# Patient Record
Sex: MALE | Race: WHITE | Employment: OTHER | URBAN - METROPOLITAN AREA
[De-identification: names, ages, dates, MRNs, and addresses within clinical notes are randomized per-mention and may not be internally consistent; named-entity substitution may affect disease eponyms.]

---

## 2022-07-09 LAB — HBA1C MFR BLD HPLC: 7.5 %

## 2022-10-15 ENCOUNTER — HOSPITAL ENCOUNTER (INPATIENT)
Age: 67
LOS: 4 days | Discharge: HOME | DRG: 864 | End: 2022-10-19
Attending: EMERGENCY MEDICINE | Admitting: INTERNAL MEDICINE
Payer: MEDICARE

## 2022-10-15 ENCOUNTER — APPOINTMENT (OUTPATIENT)
Dept: GENERAL RADIOLOGY | Age: 67
DRG: 864 | End: 2022-10-15
Attending: EMERGENCY MEDICINE
Payer: MEDICARE

## 2022-10-15 ENCOUNTER — APPOINTMENT (OUTPATIENT)
Dept: CT IMAGING | Age: 67
DRG: 864 | End: 2022-10-15
Attending: INTERNAL MEDICINE
Payer: MEDICARE

## 2022-10-15 ENCOUNTER — APPOINTMENT (OUTPATIENT)
Dept: CT IMAGING | Age: 67
DRG: 864 | End: 2022-10-15
Attending: EMERGENCY MEDICINE
Payer: MEDICARE

## 2022-10-15 DIAGNOSIS — R21 RASH AND NONSPECIFIC SKIN ERUPTION: ICD-10-CM

## 2022-10-15 DIAGNOSIS — D72.829 LEUKOCYTOSIS, UNSPECIFIED TYPE: ICD-10-CM

## 2022-10-15 DIAGNOSIS — R65.20 SEVERE SEPSIS (HCC): Primary | ICD-10-CM

## 2022-10-15 DIAGNOSIS — I95.89 OTHER SPECIFIED HYPOTENSION: ICD-10-CM

## 2022-10-15 DIAGNOSIS — R50.9 FEVER, UNSPECIFIED FEVER CAUSE: ICD-10-CM

## 2022-10-15 DIAGNOSIS — R77.8 ELEVATED TROPONIN: ICD-10-CM

## 2022-10-15 DIAGNOSIS — A41.9 SEVERE SEPSIS (HCC): Primary | ICD-10-CM

## 2022-10-15 PROBLEM — N17.9 AKI (ACUTE KIDNEY INJURY) (HCC): Status: ACTIVE | Noted: 2022-10-15

## 2022-10-15 PROBLEM — I95.9 HYPOTENSION: Status: ACTIVE | Noted: 2022-10-15

## 2022-10-15 PROBLEM — E11.9 DM TYPE 2 (DIABETES MELLITUS, TYPE 2) (HCC): Status: ACTIVE | Noted: 2022-10-15

## 2022-10-15 LAB
ALBUMIN SERPL-MCNC: 3.3 G/DL (ref 3.5–5)
ALBUMIN/GLOB SERPL: 0.7 {RATIO} (ref 1.1–2.2)
ALP SERPL-CCNC: 87 U/L (ref 45–117)
ALT SERPL-CCNC: 56 U/L (ref 12–78)
ANION GAP SERPL CALC-SCNC: 8 MMOL/L (ref 5–15)
APPEARANCE UR: ABNORMAL
AST SERPL-CCNC: 55 U/L (ref 15–37)
B PERT DNA SPEC QL NAA+PROBE: NOT DETECTED
BACTERIA URNS QL MICRO: NEGATIVE /HPF
BASOPHILS # BLD: 0 K/UL (ref 0–0.1)
BASOPHILS NFR BLD: 0 % (ref 0–1)
BILIRUB SERPL-MCNC: 0.6 MG/DL (ref 0.2–1)
BILIRUB UR QL CFM: POSITIVE
BORDETELLA PARAPERTUSSIS PCR, BORPAR: NOT DETECTED
BUN SERPL-MCNC: 22 MG/DL (ref 6–20)
BUN/CREAT SERPL: 15 (ref 12–20)
C PNEUM DNA SPEC QL NAA+PROBE: NOT DETECTED
CALCIUM SERPL-MCNC: 9.1 MG/DL (ref 8.5–10.1)
CHLORIDE SERPL-SCNC: 95 MMOL/L (ref 97–108)
CO2 SERPL-SCNC: 27 MMOL/L (ref 21–32)
COLOR UR: ABNORMAL
COMMENT, HOLDF: NORMAL
CREAT SERPL-MCNC: 1.51 MG/DL (ref 0.7–1.3)
DIFFERENTIAL METHOD BLD: ABNORMAL
EOSINOPHIL # BLD: 0.3 K/UL (ref 0–0.4)
EOSINOPHIL NFR BLD: 2 % (ref 0–7)
EPITH CASTS URNS QL MICRO: ABNORMAL /LPF
ERYTHROCYTE [DISTWIDTH] IN BLOOD BY AUTOMATED COUNT: 13.6 % (ref 11.5–14.5)
FLUAV SUBTYP SPEC NAA+PROBE: NOT DETECTED
FLUBV RNA SPEC QL NAA+PROBE: NOT DETECTED
GLOBULIN SER CALC-MCNC: 4.6 G/DL (ref 2–4)
GLUCOSE BLD STRIP.AUTO-MCNC: 170 MG/DL (ref 65–117)
GLUCOSE SERPL-MCNC: 189 MG/DL (ref 65–100)
GLUCOSE UR STRIP.AUTO-MCNC: NEGATIVE MG/DL
HADV DNA SPEC QL NAA+PROBE: NOT DETECTED
HCOV 229E RNA SPEC QL NAA+PROBE: NOT DETECTED
HCOV HKU1 RNA SPEC QL NAA+PROBE: NOT DETECTED
HCOV NL63 RNA SPEC QL NAA+PROBE: NOT DETECTED
HCOV OC43 RNA SPEC QL NAA+PROBE: NOT DETECTED
HCT VFR BLD AUTO: 37.9 % (ref 36.6–50.3)
HGB BLD-MCNC: 12.5 G/DL (ref 12.1–17)
HGB UR QL STRIP: NEGATIVE
HMPV RNA SPEC QL NAA+PROBE: NOT DETECTED
HPIV1 RNA SPEC QL NAA+PROBE: NOT DETECTED
HPIV2 RNA SPEC QL NAA+PROBE: NOT DETECTED
HPIV3 RNA SPEC QL NAA+PROBE: NOT DETECTED
HPIV4 RNA SPEC QL NAA+PROBE: NOT DETECTED
HYALINE CASTS URNS QL MICRO: >20 /LPF (ref 0–5)
IMM GRANULOCYTES # BLD AUTO: 0.1 K/UL (ref 0–0.04)
IMM GRANULOCYTES NFR BLD AUTO: 1 % (ref 0–0.5)
KETONES UR QL STRIP.AUTO: 15 MG/DL
LACTATE BLD-SCNC: 1.11 MMOL/L (ref 0.4–2)
LACTATE BLD-SCNC: 2.66 MMOL/L (ref 0.4–2)
LEUKOCYTE ESTERASE UR QL STRIP.AUTO: ABNORMAL
LYMPHOCYTES # BLD: 0.5 K/UL (ref 0.8–3.5)
LYMPHOCYTES NFR BLD: 4 % (ref 12–49)
M PNEUMO DNA SPEC QL NAA+PROBE: NOT DETECTED
MCH RBC QN AUTO: 26.3 PG (ref 26–34)
MCHC RBC AUTO-ENTMCNC: 33 G/DL (ref 30–36.5)
MCV RBC AUTO: 79.6 FL (ref 80–99)
MONOCYTES # BLD: 0.8 K/UL (ref 0–1)
MONOCYTES NFR BLD: 6 % (ref 5–13)
NEUTS SEG # BLD: 11.5 K/UL (ref 1.8–8)
NEUTS SEG NFR BLD: 87 % (ref 32–75)
NITRITE UR QL STRIP.AUTO: NEGATIVE
NRBC # BLD: 0 K/UL (ref 0–0.01)
NRBC BLD-RTO: 0 PER 100 WBC
PH UR STRIP: 5.5 [PH] (ref 5–8)
PLATELET # BLD AUTO: 297 K/UL (ref 150–400)
PMV BLD AUTO: 10.4 FL (ref 8.9–12.9)
POTASSIUM SERPL-SCNC: 3.4 MMOL/L (ref 3.5–5.1)
PROCALCITONIN SERPL-MCNC: 0.15 NG/ML
PROT SERPL-MCNC: 7.9 G/DL (ref 6.4–8.2)
PROT UR STRIP-MCNC: 100 MG/DL
RBC # BLD AUTO: 4.76 M/UL (ref 4.1–5.7)
RBC #/AREA URNS HPF: ABNORMAL /HPF (ref 0–5)
RBC MORPH BLD: ABNORMAL
RSV RNA SPEC QL NAA+PROBE: NOT DETECTED
RV+EV RNA SPEC QL NAA+PROBE: NOT DETECTED
SAMPLES BEING HELD,HOLD: NORMAL
SARS-COV-2 PCR, COVPCR: NOT DETECTED
SERVICE CMNT-IMP: ABNORMAL
SODIUM SERPL-SCNC: 130 MMOL/L (ref 136–145)
SP GR UR REFRACTOMETRY: >1.03 (ref 1–1.03)
UR CULT HOLD, URHOLD: NORMAL
UROBILINOGEN UR QL STRIP.AUTO: 1 EU/DL (ref 0.2–1)
WBC # BLD AUTO: 13.2 K/UL (ref 4.1–11.1)
WBC URNS QL MICRO: ABNORMAL /HPF (ref 0–4)
YEAST URNS QL MICRO: PRESENT

## 2022-10-15 PROCEDURE — 74011250636 HC RX REV CODE- 250/636: Performed by: EMERGENCY MEDICINE

## 2022-10-15 PROCEDURE — 74011000636 HC RX REV CODE- 636: Performed by: INTERNAL MEDICINE

## 2022-10-15 PROCEDURE — 0202U NFCT DS 22 TRGT SARS-COV-2: CPT

## 2022-10-15 PROCEDURE — 82962 GLUCOSE BLOOD TEST: CPT

## 2022-10-15 PROCEDURE — 74011250637 HC RX REV CODE- 250/637: Performed by: EMERGENCY MEDICINE

## 2022-10-15 PROCEDURE — 87040 BLOOD CULTURE FOR BACTERIA: CPT

## 2022-10-15 PROCEDURE — 74011000250 HC RX REV CODE- 250: Performed by: EMERGENCY MEDICINE

## 2022-10-15 PROCEDURE — 36415 COLL VENOUS BLD VENIPUNCTURE: CPT

## 2022-10-15 PROCEDURE — 83605 ASSAY OF LACTIC ACID: CPT

## 2022-10-15 PROCEDURE — 96361 HYDRATE IV INFUSION ADD-ON: CPT

## 2022-10-15 PROCEDURE — 80053 COMPREHEN METABOLIC PANEL: CPT

## 2022-10-15 PROCEDURE — 84145 PROCALCITONIN (PCT): CPT

## 2022-10-15 PROCEDURE — 74011250636 HC RX REV CODE- 250/636: Performed by: HOSPITALIST

## 2022-10-15 PROCEDURE — 85025 COMPLETE CBC W/AUTO DIFF WBC: CPT

## 2022-10-15 PROCEDURE — 74011000250 HC RX REV CODE- 250: Performed by: INTERNAL MEDICINE

## 2022-10-15 PROCEDURE — 70487 CT MAXILLOFACIAL W/DYE: CPT

## 2022-10-15 PROCEDURE — 99285 EMERGENCY DEPT VISIT HI MDM: CPT

## 2022-10-15 PROCEDURE — 74011250636 HC RX REV CODE- 250/636: Performed by: INTERNAL MEDICINE

## 2022-10-15 PROCEDURE — 71045 X-RAY EXAM CHEST 1 VIEW: CPT

## 2022-10-15 PROCEDURE — 87086 URINE CULTURE/COLONY COUNT: CPT

## 2022-10-15 PROCEDURE — 81001 URINALYSIS AUTO W/SCOPE: CPT

## 2022-10-15 PROCEDURE — 70450 CT HEAD/BRAIN W/O DYE: CPT

## 2022-10-15 PROCEDURE — 65270000046 HC RM TELEMETRY

## 2022-10-15 PROCEDURE — 96374 THER/PROPH/DIAG INJ IV PUSH: CPT

## 2022-10-15 RX ORDER — LATANOPROST 50 UG/ML
1 SOLUTION/ DROPS OPHTHALMIC
COMMUNITY

## 2022-10-15 RX ORDER — SILDENAFIL 100 MG/1
25 TABLET, FILM COATED ORAL
COMMUNITY

## 2022-10-15 RX ORDER — SODIUM CHLORIDE AND POTASSIUM CHLORIDE 150; 900 MG/100ML; MG/100ML
INJECTION, SOLUTION INTRAVENOUS CONTINUOUS
Status: DISCONTINUED | OUTPATIENT
Start: 2022-10-15 | End: 2022-10-16

## 2022-10-15 RX ORDER — METRONIDAZOLE 500 MG/100ML
500 INJECTION, SOLUTION INTRAVENOUS EVERY 12 HOURS
Status: DISCONTINUED | OUTPATIENT
Start: 2022-10-15 | End: 2022-10-17

## 2022-10-15 RX ORDER — LISINOPRIL 5 MG/1
5 TABLET ORAL 2 TIMES DAILY
COMMUNITY
End: 2022-10-19

## 2022-10-15 RX ORDER — SODIUM CHLORIDE 0.9 % (FLUSH) 0.9 %
5-40 SYRINGE (ML) INJECTION AS NEEDED
Status: DISCONTINUED | OUTPATIENT
Start: 2022-10-15 | End: 2022-10-19 | Stop reason: HOSPADM

## 2022-10-15 RX ORDER — AMLODIPINE BESYLATE 10 MG/1
10 TABLET ORAL DAILY
COMMUNITY
End: 2022-10-19

## 2022-10-15 RX ORDER — HYDROCHLOROTHIAZIDE 25 MG/1
25 TABLET ORAL DAILY
COMMUNITY

## 2022-10-15 RX ORDER — TIMOLOL MALEATE 6.8 MG/ML
1 SOLUTION/ DROPS OPHTHALMIC DAILY
COMMUNITY

## 2022-10-15 RX ORDER — SODIUM CHLORIDE 0.9 % (FLUSH) 0.9 %
5-40 SYRINGE (ML) INJECTION EVERY 8 HOURS
Status: DISCONTINUED | OUTPATIENT
Start: 2022-10-15 | End: 2022-10-19 | Stop reason: HOSPADM

## 2022-10-15 RX ORDER — ACETAMINOPHEN 500 MG
1000 TABLET ORAL
Status: COMPLETED | OUTPATIENT
Start: 2022-10-15 | End: 2022-10-15

## 2022-10-15 RX ORDER — GLIPIZIDE 10 MG/1
10 TABLET ORAL
COMMUNITY

## 2022-10-15 RX ORDER — METFORMIN HYDROCHLORIDE 500 MG/1
500 TABLET ORAL 2 TIMES DAILY WITH MEALS
COMMUNITY

## 2022-10-15 RX ORDER — ASPIRIN 81 MG/1
81 TABLET ORAL DAILY
COMMUNITY

## 2022-10-15 RX ADMIN — POTASSIUM CHLORIDE AND SODIUM CHLORIDE: 900; 150 INJECTION, SOLUTION INTRAVENOUS at 21:50

## 2022-10-15 RX ADMIN — SODIUM CHLORIDE 1000 ML: 9 INJECTION, SOLUTION INTRAVENOUS at 16:07

## 2022-10-15 RX ADMIN — SODIUM CHLORIDE, PRESERVATIVE FREE 10 ML: 5 INJECTION INTRAVENOUS at 17:29

## 2022-10-15 RX ADMIN — METRONIDAZOLE 500 MG: 500 INJECTION, SOLUTION INTRAVENOUS at 19:00

## 2022-10-15 RX ADMIN — ACETAMINOPHEN 1000 MG: 500 TABLET ORAL at 16:07

## 2022-10-15 RX ADMIN — CEFEPIME 2 G: 2 INJECTION, POWDER, FOR SOLUTION INTRAVENOUS at 16:07

## 2022-10-15 RX ADMIN — SODIUM CHLORIDE, PRESERVATIVE FREE 10 ML: 5 INJECTION INTRAVENOUS at 23:35

## 2022-10-15 RX ADMIN — VANCOMYCIN HYDROCHLORIDE 2250 MG: 10 INJECTION, POWDER, LYOPHILIZED, FOR SOLUTION INTRAVENOUS at 18:45

## 2022-10-15 RX ADMIN — SODIUM CHLORIDE 1000 ML: 9 INJECTION, SOLUTION INTRAVENOUS at 17:29

## 2022-10-15 RX ADMIN — IOPAMIDOL 100 ML: 755 INJECTION, SOLUTION INTRAVENOUS at 18:22

## 2022-10-15 RX ADMIN — SODIUM CHLORIDE 1000 ML: 9 INJECTION, SOLUTION INTRAVENOUS at 20:33

## 2022-10-15 RX ADMIN — CEFEPIME 2 G: 2 INJECTION, POWDER, FOR SOLUTION INTRAVENOUS at 18:44

## 2022-10-15 NOTE — PROGRESS NOTES
[facility] Dosing Services: Antimicrobial Stewardship Daily Doc    Consult for antibiotic dosing of Vancomycin/Cefepime/Flagyl by Dr. Lucía Llamas  Indication: Severe sepsis; Patient had abscessed tooth pulled 10 days ago. He was prescribed 5-day course of antibiotics. Developed fever after completing his antibiotics. He saw his PCP 3 days ago and was told WBC were elevated. Was prescribed a 10-day course of doxycycline. Fever continued until yesterday. Day of Therapy: 1    Ht Readings from Last 1 Encounters:   10/15/22 177.8 cm (70\")        Wt Readings from Last 1 Encounters:   10/15/22 93 kg (205 lb)      Vancomycin therapy:  Loading dose: 2250mg IV x 1   Maintenance dose: 1250mg IV every 24 hours for predicted AUC of 462  Last level: N/A - new start   Dose calculated to approximate a           a. Target AUC/MIKHAIL of 400-600          b. Trough of: N/A      Assess/Plan: WBC = 13.2; Febrile --> Tmax = 102.5; Hypotensive; SCr = 4.04 (uncertain baseline) which estimates CrCl~54. Continue current dose; Draw random Vanc level within 24-48 hours of first maint dose. Dose administration notes:       Date Dose & Interval Measured (mcg/mL) Extrapolated (mcg/mL)   ? ? ? ?   ? ? ? ?   ? ? ? ? Non-Kinetic Antimicrobial Dosing Regimen:   Current Regimen:    Cefepime 2g IV every 12 hours (Approp for eCrCl ~54ml/min)  Flagyl 500mg IV every 12 hours   Recommendation: Continue   Dose administration notes: Other Antimicrobial   (not dosed by pharmacist) None   Cultures 10/15 Blood - pending  10/15 Urine - pending    Serum Creatinine Lab Results   Component Value Date/Time    Creatinine 1.51 (H) 10/15/2022 03:48 PM         Creatinine Clearance Estimated Creatinine Clearance: 54.4 mL/min (A) (based on SCr of 1.51 mg/dL (H)).      Temp Temp: (!) 100.8 °F (38.2 °C)       WBC Lab Results   Component Value Date/Time    WBC 13.2 (H) 10/15/2022 03:48 PM        Procalcitonin Lab Results   Component Value Date/Time    Procalcitonin 0.15 10/15/2022 03:48 PM        For Antifungals, Metronidazole and Nafcillin: Lab Results   Component Value Date/Time    ALT (SGPT) 56 10/15/2022 03:48 PM    AST (SGOT) 55 (H) 10/15/2022 03:48 PM    Alk.  phosphatase 87 10/15/2022 03:48 PM    Bilirubin, total 0.6 10/15/2022 03:48 PM        Pharmacist Jace Gandhi

## 2022-10-15 NOTE — ED TRIAGE NOTES
Pt states on 10/5 he had Abscessed  tooth removal, antibiotics completed this past Sunday. Following completion of antibiotics patient began having fever and chills. Wife states patient had episode PTA at approxiately 1330 where he began shaking and eyes rolled back, with shaking periodically happening between then and arrival to ED. Pt states he was aware of episode. Pt currently A&Ox4. Pt states he took 800 mg of Ibuprofen at 0800 today. Temp 102.5 F . Code Sepsis called in triage.

## 2022-10-15 NOTE — ED NOTES
TRANSFER - OUT REPORT:    Lines:   Peripheral IV 10/15/22 Right Antecubital (Active)   Site Assessment Clean, dry, & intact 10/15/22 1547       Peripheral IV 10/15/22 Left Antecubital (Active)   Site Assessment Clean, dry, & intact 10/15/22 1600   Phlebitis Assessment 0 10/15/22 1600   Infiltration Assessment 0 10/15/22 1600   Dressing Status Clean, dry, & intact 10/15/22 1600        Opportunity for questions and clarification was provided. Patient transported with:   Monitor  Registered Nurse         Verbal report given to [address] on Zeina Lazaro being transferred to [facility] for routine progression of care    Report consisted of patient's Situation, Background, Assessment and Recommendations (SBAR)    Information from the following report(s)  SBAR, Kardex, ED Summary, Intake/Output, MAR, Recent Results, and Quality Measures was reviewed with the receiving nurse. Opportunity for questions and clarification was provided.     Patient transported with:  Monitor  Registered Nurse    Last Filed Values:  Temp: 99.1 °F (37.3 °C) (10/15/22 1913)  Pulse (Heart Rate): (!) 117 (10/15/22 1913)  Resp Rate: (!) 34 (10/15/22 1913)  O2 Sat (%): 91 % (10/15/22 1913)  BP: (!) 84/53 (10/15/22 1913)  MAP (Monitor): 111 (10/15/22 1614)  MAP (Calculated): (!) 63 (10/15/22 1913)  Level of Consciousness: Alert (0) (10/15/22 1913)      Lab Results   Component Value Date/Time    WBC 13.2 (H) 10/15/2022 03:48 PM       Repeat LA:  1.11    Blood Cultures Drawn:  yes    Fluid Resuscitation:  Total needed 3L, Status infusing    All Antibiotics Started:  yes, Dose Due     VS x 2 post-fluid resuscitation:   yes    Vasopressor Infusion:  no       Provider Reassessment needed and notified:  yes ,        Lines:   Peripheral IV 10/15/22 Right Antecubital (Active)   Site Assessment Clean, dry, & intact 10/15/22 1547       Peripheral IV 10/15/22 Left Antecubital (Active)   Site Assessment Clean, dry, & intact 10/15/22 1600   Phlebitis Assessment 0 10/15/22 1600   Infiltration Assessment 0 10/15/22 1600   Dressing Status Clean, dry, & intact 10/15/22 1600        Opportunity for questions and clarification was provided.       Patient transported with:   Monitor  Registered Nurse

## 2022-10-15 NOTE — H&P
Venkat Dobson [facility] [place] [facility]  [address] [facility], [address] [place] [address]  [phone]    Hospitalist Admission History and Physical      NAME:  Zina Peterson   :   1955   MRN:  [id number]     PCP:  Rocio Saleh MD     Date/Time of service:  10/15/2022  6:11 PM        Subjective:     CHIEF COMPLAINT: rigors      HISTORY OF PRESENT ILLNESS:     The patient is a 78 yo hx of HTN, DM, prostate CA, recent tooth abscess, presented w/ fevers, rigors, severe sepsis. The patient was diagnosed with a left lower jaw tooth abscess 10 days ago. He was prescribed oral clindamycin for 5 days. Soon after the abx course, the patient c/o fevers, chills, rigors. His PCP prescribed 10 more days of oral Doxy, but symptoms persisted. Denied stiff neck, cough, chest pain, SOB, nausea, vomiting, diarrhea, or tick bites. In the ED, WBC was 13.2. U/A contaminated with epithelials. CXR neg pneumonia. Allergies   Allergen Reactions    Penicillins Hives       Prior to Admission medications    Medication Sig Start Date End Date Taking? Authorizing Provider   hydroCHLOROthiazide (HYDRODIURIL) 25 mg tablet Take 25 mg by mouth daily. Yes Provider, Historical   amLODIPine (Norvasc) 10 mg tablet Take 10 mg by mouth daily. Yes Provider, Historical   lisinopriL (PRINIVIL, ZESTRIL) 5 mg tablet Take 5 mg by mouth two (2) times a day. Yes Provider, Historical   glipiZIDE (GLUCOTROL) 10 mg tablet Take 10 mg by mouth Daily (before breakfast). Yes Provider, Historical   metFORMIN (GLUCOPHAGE) 500 mg tablet Take 500 mg by mouth two (2) times daily (with meals). Yes Provider, Historical   aspirin delayed-release 81 mg tablet Take 81 mg by mouth daily. Yes Provider, Historical   sildenafil citrate (VIAGRA) 100 mg tablet Take 25 mg by mouth daily as needed for Erectile Dysfunction. Yes Provider, Historical   latanoprost (XALATAN) 0.005 % ophthalmic solution Administer 1 Drop to both eyes nightly.    Yes Provider, Historical   timoloL maleate 0.5 % drpd ophthalmic solution Administer 1 Drop to both eyes daily. Yes Provider, Historical       Past Medical History:   Diagnosis Date    DM type 2 (diabetes mellitus, type 2) ([facility] [address]     Hypertension     Prostate cancer ([facility]         No past surgical history on file. Social History     Tobacco Use    Smoking status: Unknown    Smokeless tobacco: Never   Substance Use Topics    Alcohol use: Yes        Family History   Problem Relation Age of Onset    Hypertension Father         Review of Systems:  (bold if positive, if negative)    Gen:  fever, chills,Eyes:  ENT:  CVS:  Pulm:  GI:  :  MS:  Skin:  Psych:  Endo:  Hem:  Renal:  Neuro:          Objective:      VITALS:    Vital signs reviewed; most recent are:    Visit Vitals  BP (!) 96/57 (BP 1 Location: Right upper arm, BP Patient Position: At rest;Supine)   Pulse (!) 125   Temp (!) 100.8 °F (38.2 °C)   Resp (!) 34   Ht 5' 10\" (1.778 m)   Wt 93 kg (205 lb)   SpO2 95%   BMI 29.41 kg/m²     SpO2 Readings from Last 6 Encounters:   10/15/22 95%        No intake or output data in the 24 hours ending 10/15/22 1811     Exam:     Physical Exam:    Gen:  Well-developed, well-nourished, obese, mild distress  HEENT:  Pink conjunctivae, PERRL, hearing intact to voice, moist mucous membranes  Neck:  Supple, without masses, no stiff neck   Resp:  No accessory muscle use, clear breath sounds without wheezes rales or rhonchi  Card:  No murmurs, normal S1, S2 without thrills, bruits or peripheral edema  Abd:  Soft, non-tender, non-distended, normoactive bowel sounds are present  Lymph:  No cervical adenopathy  Musc:  No cyanosis or clubbing, cap refills <2sec, pulses 2+  Skin:  No rashes   Neuro:  Cranial nerves 3-12 are grossly intact, follows commands appropriately  Psych:  Alert with good insight.   Oriented to person, place, and time    Labs:    Recent Labs     10/15/22  1548   WBC 13.2*   HGB 12.5   HCT 37.9        Recent Labs 10/15/22  1548   *   K 3.4*   CL 95*   CO2 27   *   BUN 22*   CREA 1.51*   CA 9.1   ALB 3.3*   TBILI 0.6   ALT 56     No results found for: GLUCPOC  No results for input(s): PH, PCO2, PO2, HCO3, FIO2 in the last 72 hours. No results for input(s): INR, INREXT in the last 72 hours. Radiology and EKG reviewed:   CXR neg    **Old Records reviewed in [facility]**       Assessment/Plan:       Principal Problem:    80 yo hx of HTN, DM, prostate CA, recent tooth abscess, presented w/ fevers, rigors, severe sepsis    1) Severe sepsis: unclear source. Suspect tooth abscess. Will obtain face/maxillary CT, blood Cx, lactate, viral panel, pro-calcitonin. Empirically start on IV Vanc/cefepime/flagyl    2) Hypotension: due to sepsis. BP responding to IVF. Low threshold for ICU, pressors    3) Seizure-like activities: reported by ED, but symptoms were consistent with rigors, fevers. Head CT pending. Will monitor     4) ALYSHA: due to sepsis, dehydration. Cont IVF, monitor BMP    5) HTN: hold norvasc, HCTZ, lisinopril    6) DM type 2: check A1C. Hold metformin, glipizide.   Start SSI    Risk of deterioration: high      Total time spent with patient care: 70 Minutes (35 min on critical care)   **I personally saw and examined the patient during this time period**                 Care Plan discussed with: Patient, nursing, wife    Discussed:  Care Plan    Prophylaxis:  Lovenox    Probable Disposition:  Home w/Family           ___________________________________________________    Attending Physician: Romulo Henderson MD

## 2022-10-15 NOTE — ED PROVIDER NOTES
History of hypertension, diabetes. He presents accompanied by his wife after she witnessed him have seizure activity at home. He states that he was sitting in a chair when he began to shake uncontrollably. He called his wife into the room, and she noted he was shaking and sweaty. She then states that his eyes rolled back in his head, and he had seizure activity for about 15 seconds. She states that he regained consciousness quickly and asked, Wicho Johnson happened? \"  He continued to shake for a while after that. He remembers feeling nauseated and lightheaded. He states that he had an abscessed tooth pulled 10 days ago. He was prescribed a 5-day course of antibiotics. He developed a fever the day (5 days ago) after completing his antibiotics. He saw his PCP 3 days ago and was told that his white blood cell count was high. He was prescribed a 10-day course of doxycycline. The fever continued until yesterday. He says that it got to his high as 101. He states he was feeling better until he began shaking today. He denies cough, congestion, vomiting, diarrhea. He states that he has not been eating as much because of the tooth that was bothering him. No past medical history on file. No past surgical history on file. No family history on file.     Social History     Socioeconomic History    Marital status:      Spouse name: Not on file    Number of children: Not on file    Years of education: Not on file    Highest education level: Not on file   Occupational History    Not on file   Tobacco Use    Smoking status: Not on file    Smokeless tobacco: Not on file   Substance and Sexual Activity    Alcohol use: Not on file    Drug use: Not on file    Sexual activity: Not on file   Other Topics Concern    Not on file   Social History Narrative    Not on file     Social Determinants of Health     Financial Resource Strain: Not on file   Food Insecurity: Not on file   Transportation Needs: Not on file Physical Activity: Not on file   Stress: Not on file   Social Connections: Not on file   Intimate Partner Violence: Not on file   Housing Stability: Not on file         ALLERGIES: Penicillins    Review of Systems   All other systems reviewed and are negative. Vitals:    10/15/22 1540   BP: (!) 94/57   Pulse: (!) 145   Resp: 18   Temp: (!) 102.5 °F (39.2 °C)   SpO2: 96%   Weight: 93 kg (205 lb)   Height: 5' 10\" (1.778 m)            Physical Exam  Vitals and nursing note reviewed. Constitutional:       Appearance: He is well-developed. Comments: Mildly ill-appearing. HENT:      Head: Normocephalic and atraumatic. Eyes:      Conjunctiva/sclera: Conjunctivae normal.   Neck:      Trachea: No tracheal deviation. Cardiovascular:      Rate and Rhythm: Regular rhythm. Tachycardia present. Heart sounds: Normal heart sounds. No murmur heard. No friction rub. No gallop. Pulmonary:      Effort: Pulmonary effort is normal.      Breath sounds: Normal breath sounds. Abdominal:      Palpations: Abdomen is soft. Tenderness: There is no abdominal tenderness. Musculoskeletal:         General: No deformity. Cervical back: Neck supple. Skin:     General: Skin is warm and dry. Neurological:      Mental Status: He is alert. Comments: oriented        MDM         Procedures    Perfect Serve Consult for Admission  5:29 PM    ED Room Number: ER17/17  Patient Name and age:  Kellen Raygoza 79 y.o.  male  Working Diagnosis: Sepsis    COVID-19 Suspicion:  no  Sepsis present:  yes  Reassessment needed: yes  Code Status:  Full Code  Readmission: no  Isolation Requirements:  no  Recommended Level of Care:  step down  Department:[facility] ED - [phone]  Other: He presents after what his wife suspected was a seizure. He developed generalized shaking while sitting in the chair prior to arrival.  He then became unresponsive for a short time. She witnessed some brief seizure-like activity. ? Syncope versus seizure. Febrile upon arrival to 102.5. Blood pressures have been soft with a current map of 65. He has been tachycardic. Lactate 2.7. White count 13.2. UA with 25-50 white blood cells so may be the source. However, he has also had a recent dental procedure. 30 cc/kg normal saline and cefepime. Consult note: I reached out to the hospitalist service via [employer] for admission. Reji Da Silva MD  5:37 PM    IMPRESSION:  1. Severe sepsis ([address]        - Broad Spectrum Antibiotics ordered: Cefepime  - Repeat lactic acid ordered for time 1740  - Re-assessment performed at time 1740 and clinical condition stable.     - Hypotension or Lactic Acidosis present (SBP<90, MAP<65, Lactate >4): YES IVF:  30cc/kg actual Body Weight  - Persistent Hypotension despite IVF resuscitation: NO  Vasopressors: Not indicated due to septic shock not present    Plan:  Admit to Step down    Total critical care time (not including time spent performing separately reportable procedures): 35 minutes      Reji Da Silva MD

## 2022-10-16 ENCOUNTER — APPOINTMENT (OUTPATIENT)
Dept: GENERAL RADIOLOGY | Age: 67
DRG: 864 | End: 2022-10-16
Attending: INTERNAL MEDICINE
Payer: MEDICARE

## 2022-10-16 LAB
ALBUMIN SERPL-MCNC: 2.6 G/DL (ref 3.5–5)
ALBUMIN/GLOB SERPL: 0.6 {RATIO} (ref 1.1–2.2)
ALP SERPL-CCNC: 79 U/L (ref 45–117)
ALT SERPL-CCNC: 98 U/L (ref 12–78)
ANION GAP SERPL CALC-SCNC: 7 MMOL/L (ref 5–15)
APTT PPP: 31 SEC (ref 22.1–31)
AST SERPL-CCNC: 61 U/L (ref 15–37)
BACTERIA SPEC CULT: NORMAL
BILIRUB DIRECT SERPL-MCNC: 0.2 MG/DL (ref 0–0.2)
BILIRUB SERPL-MCNC: 0.5 MG/DL (ref 0.2–1)
BUN SERPL-MCNC: 11 MG/DL (ref 6–20)
BUN/CREAT SERPL: 12 (ref 12–20)
CALCIUM SERPL-MCNC: 8.2 MG/DL (ref 8.5–10.1)
CHLORIDE SERPL-SCNC: 106 MMOL/L (ref 97–108)
CO2 SERPL-SCNC: 23 MMOL/L (ref 21–32)
COMMENT, HOLDF: NORMAL
CREAT SERPL-MCNC: 0.94 MG/DL (ref 0.7–1.3)
ERYTHROCYTE [DISTWIDTH] IN BLOOD BY AUTOMATED COUNT: 13.9 % (ref 11.5–14.5)
EST. AVERAGE GLUCOSE BLD GHB EST-MCNC: 143 MG/DL
GLOBULIN SER CALC-MCNC: 4.1 G/DL (ref 2–4)
GLUCOSE BLD STRIP.AUTO-MCNC: 112 MG/DL (ref 65–117)
GLUCOSE BLD STRIP.AUTO-MCNC: 117 MG/DL (ref 65–117)
GLUCOSE BLD STRIP.AUTO-MCNC: 118 MG/DL (ref 65–117)
GLUCOSE BLD STRIP.AUTO-MCNC: 151 MG/DL (ref 65–117)
GLUCOSE SERPL-MCNC: 123 MG/DL (ref 65–100)
HBA1C MFR BLD: 6.6 % (ref 4–5.6)
HCT VFR BLD AUTO: 32.2 % (ref 36.6–50.3)
HGB BLD-MCNC: 10.6 G/DL (ref 12.1–17)
INR PPP: 1 (ref 0.9–1.1)
LACTATE SERPL-SCNC: 1.3 MMOL/L (ref 0.4–2)
MAGNESIUM SERPL-MCNC: 2.2 MG/DL (ref 1.6–2.4)
MCH RBC QN AUTO: 26.6 PG (ref 26–34)
MCHC RBC AUTO-ENTMCNC: 32.9 G/DL (ref 30–36.5)
MCV RBC AUTO: 80.7 FL (ref 80–99)
NRBC # BLD: 0 K/UL (ref 0–0.01)
NRBC BLD-RTO: 0 PER 100 WBC
PHOSPHATE SERPL-MCNC: 1.9 MG/DL (ref 2.6–4.7)
PLATELET # BLD AUTO: 245 K/UL (ref 150–400)
PMV BLD AUTO: 10 FL (ref 8.9–12.9)
POTASSIUM SERPL-SCNC: 3.3 MMOL/L (ref 3.5–5.1)
PROT SERPL-MCNC: 6.7 G/DL (ref 6.4–8.2)
PROTHROMBIN TIME: 10.2 SEC (ref 9–11.1)
RBC # BLD AUTO: 3.99 M/UL (ref 4.1–5.7)
SAMPLES BEING HELD,HOLD: NORMAL
SERVICE CMNT-IMP: ABNORMAL
SERVICE CMNT-IMP: ABNORMAL
SERVICE CMNT-IMP: NORMAL
SODIUM SERPL-SCNC: 136 MMOL/L (ref 136–145)
THERAPEUTIC RANGE,PTTT: NORMAL SECS (ref 58–77)
WBC # BLD AUTO: 9.5 K/UL (ref 4.1–11.1)

## 2022-10-16 PROCEDURE — 93005 ELECTROCARDIOGRAM TRACING: CPT

## 2022-10-16 PROCEDURE — 74011250637 HC RX REV CODE- 250/637: Performed by: INTERNAL MEDICINE

## 2022-10-16 PROCEDURE — 83036 HEMOGLOBIN GLYCOSYLATED A1C: CPT

## 2022-10-16 PROCEDURE — 82962 GLUCOSE BLOOD TEST: CPT

## 2022-10-16 PROCEDURE — 85027 COMPLETE CBC AUTOMATED: CPT

## 2022-10-16 PROCEDURE — 74011000250 HC RX REV CODE- 250: Performed by: EMERGENCY MEDICINE

## 2022-10-16 PROCEDURE — 85730 THROMBOPLASTIN TIME PARTIAL: CPT

## 2022-10-16 PROCEDURE — 65270000046 HC RM TELEMETRY

## 2022-10-16 PROCEDURE — 74011250636 HC RX REV CODE- 250/636: Performed by: INTERNAL MEDICINE

## 2022-10-16 PROCEDURE — 83735 ASSAY OF MAGNESIUM: CPT

## 2022-10-16 PROCEDURE — 36415 COLL VENOUS BLD VENIPUNCTURE: CPT

## 2022-10-16 PROCEDURE — 84100 ASSAY OF PHOSPHORUS: CPT

## 2022-10-16 PROCEDURE — 83605 ASSAY OF LACTIC ACID: CPT

## 2022-10-16 PROCEDURE — 71045 X-RAY EXAM CHEST 1 VIEW: CPT

## 2022-10-16 PROCEDURE — 74011000258 HC RX REV CODE- 258: Performed by: INTERNAL MEDICINE

## 2022-10-16 PROCEDURE — 85610 PROTHROMBIN TIME: CPT

## 2022-10-16 PROCEDURE — 80048 BASIC METABOLIC PNL TOTAL CA: CPT

## 2022-10-16 PROCEDURE — 80076 HEPATIC FUNCTION PANEL: CPT

## 2022-10-16 PROCEDURE — 74011000250 HC RX REV CODE- 250: Performed by: INTERNAL MEDICINE

## 2022-10-16 PROCEDURE — 97165 OT EVAL LOW COMPLEX 30 MIN: CPT

## 2022-10-16 RX ORDER — SODIUM CHLORIDE 0.9 % (FLUSH) 0.9 %
5-40 SYRINGE (ML) INJECTION EVERY 8 HOURS
Status: DISCONTINUED | OUTPATIENT
Start: 2022-10-16 | End: 2022-10-19 | Stop reason: HOSPADM

## 2022-10-16 RX ORDER — ACETAMINOPHEN 650 MG/1
650 SUPPOSITORY RECTAL
Status: DISCONTINUED | OUTPATIENT
Start: 2022-10-16 | End: 2022-10-19 | Stop reason: HOSPADM

## 2022-10-16 RX ORDER — FACIAL-BODY WIPES
10 EACH TOPICAL DAILY PRN
Status: DISCONTINUED | OUTPATIENT
Start: 2022-10-16 | End: 2022-10-19 | Stop reason: HOSPADM

## 2022-10-16 RX ORDER — ENOXAPARIN SODIUM 100 MG/ML
40 INJECTION SUBCUTANEOUS DAILY
Status: DISCONTINUED | OUTPATIENT
Start: 2022-10-16 | End: 2022-10-19 | Stop reason: HOSPADM

## 2022-10-16 RX ORDER — METOPROLOL TARTRATE 25 MG/1
12.5 TABLET, FILM COATED ORAL EVERY 12 HOURS
Status: DISCONTINUED | OUTPATIENT
Start: 2022-10-16 | End: 2022-10-18

## 2022-10-16 RX ORDER — AMOXICILLIN 250 MG
1 CAPSULE ORAL 2 TIMES DAILY
Status: DISCONTINUED | OUTPATIENT
Start: 2022-10-16 | End: 2022-10-19 | Stop reason: HOSPADM

## 2022-10-16 RX ORDER — TIMOLOL MALEATE 5 MG/ML
1 SOLUTION/ DROPS OPHTHALMIC DAILY
Status: DISCONTINUED | OUTPATIENT
Start: 2022-10-16 | End: 2022-10-19 | Stop reason: HOSPADM

## 2022-10-16 RX ORDER — HYDROMORPHONE HYDROCHLORIDE 1 MG/ML
0.5 INJECTION, SOLUTION INTRAMUSCULAR; INTRAVENOUS; SUBCUTANEOUS
Status: DISCONTINUED | OUTPATIENT
Start: 2022-10-16 | End: 2022-10-19 | Stop reason: HOSPADM

## 2022-10-16 RX ORDER — LATANOPROST 50 UG/ML
1 SOLUTION/ DROPS OPHTHALMIC
Status: DISCONTINUED | OUTPATIENT
Start: 2022-10-16 | End: 2022-10-19 | Stop reason: HOSPADM

## 2022-10-16 RX ORDER — SODIUM CHLORIDE 0.9 % (FLUSH) 0.9 %
5-40 SYRINGE (ML) INJECTION AS NEEDED
Status: DISCONTINUED | OUTPATIENT
Start: 2022-10-16 | End: 2022-10-19 | Stop reason: HOSPADM

## 2022-10-16 RX ORDER — ASPIRIN 81 MG/1
81 TABLET ORAL DAILY
Status: DISCONTINUED | OUTPATIENT
Start: 2022-10-16 | End: 2022-10-19 | Stop reason: HOSPADM

## 2022-10-16 RX ORDER — MAGNESIUM SULFATE 100 %
4 CRYSTALS MISCELLANEOUS AS NEEDED
Status: DISCONTINUED | OUTPATIENT
Start: 2022-10-16 | End: 2022-10-19 | Stop reason: HOSPADM

## 2022-10-16 RX ORDER — SODIUM CHLORIDE 9 MG/ML
25 INJECTION, SOLUTION INTRAVENOUS AS NEEDED
Status: DISCONTINUED | OUTPATIENT
Start: 2022-10-16 | End: 2022-10-19 | Stop reason: HOSPADM

## 2022-10-16 RX ORDER — ONDANSETRON 2 MG/ML
4 INJECTION INTRAMUSCULAR; INTRAVENOUS
Status: DISCONTINUED | OUTPATIENT
Start: 2022-10-16 | End: 2022-10-19 | Stop reason: HOSPADM

## 2022-10-16 RX ORDER — ACETAMINOPHEN 325 MG/1
650 TABLET ORAL
Status: DISCONTINUED | OUTPATIENT
Start: 2022-10-16 | End: 2022-10-19 | Stop reason: HOSPADM

## 2022-10-16 RX ORDER — PROMETHAZINE HYDROCHLORIDE 25 MG/1
12.5 TABLET ORAL
Status: DISCONTINUED | OUTPATIENT
Start: 2022-10-16 | End: 2022-10-19 | Stop reason: HOSPADM

## 2022-10-16 RX ORDER — INSULIN LISPRO 100 [IU]/ML
INJECTION, SOLUTION INTRAVENOUS; SUBCUTANEOUS
Status: DISCONTINUED | OUTPATIENT
Start: 2022-10-16 | End: 2022-10-19 | Stop reason: HOSPADM

## 2022-10-16 RX ORDER — BUMETANIDE 0.25 MG/ML
0.5 INJECTION INTRAMUSCULAR; INTRAVENOUS ONCE
Status: COMPLETED | OUTPATIENT
Start: 2022-10-16 | End: 2022-10-16

## 2022-10-16 RX ADMIN — METOPROLOL TARTRATE 12.5 MG: 25 TABLET, FILM COATED ORAL at 19:50

## 2022-10-16 RX ADMIN — VANCOMYCIN HYDROCHLORIDE 1000 MG: 1 INJECTION, POWDER, LYOPHILIZED, FOR SOLUTION INTRAVENOUS at 16:40

## 2022-10-16 RX ADMIN — CEFEPIME 2 G: 2 INJECTION, POWDER, FOR SOLUTION INTRAVENOUS at 07:46

## 2022-10-16 RX ADMIN — LATANOPROST 1 DROP: 50 SOLUTION/ DROPS OPHTHALMIC at 21:09

## 2022-10-16 RX ADMIN — BUMETANIDE 0.5 MG: 0.25 INJECTION, SOLUTION INTRAMUSCULAR; INTRAVENOUS at 22:00

## 2022-10-16 RX ADMIN — TIMOLOL MALEATE 1 DROP: 5 SOLUTION/ DROPS OPHTHALMIC at 10:04

## 2022-10-16 RX ADMIN — METRONIDAZOLE 500 MG: 500 INJECTION, SOLUTION INTRAVENOUS at 18:10

## 2022-10-16 RX ADMIN — ENOXAPARIN SODIUM 40 MG: 100 INJECTION SUBCUTANEOUS at 08:00

## 2022-10-16 RX ADMIN — SODIUM CHLORIDE, PRESERVATIVE FREE 10 ML: 5 INJECTION INTRAVENOUS at 21:06

## 2022-10-16 RX ADMIN — SODIUM CHLORIDE, PRESERVATIVE FREE 10 ML: 5 INJECTION INTRAVENOUS at 06:29

## 2022-10-16 RX ADMIN — POTASSIUM CHLORIDE AND SODIUM CHLORIDE: 900; 150 INJECTION, SOLUTION INTRAVENOUS at 14:10

## 2022-10-16 RX ADMIN — CEFEPIME 2 G: 2 INJECTION, POWDER, FOR SOLUTION INTRAVENOUS at 18:10

## 2022-10-16 RX ADMIN — METRONIDAZOLE 500 MG: 500 INJECTION, SOLUTION INTRAVENOUS at 06:29

## 2022-10-16 RX ADMIN — ASPIRIN 81 MG: 81 TABLET, COATED ORAL at 08:00

## 2022-10-16 RX ADMIN — POTASSIUM PHOSPHATE, MONOBASIC AND POTASSIUM PHOSPHATE, DIBASIC: 224; 236 INJECTION, SOLUTION, CONCENTRATE INTRAVENOUS at 12:09

## 2022-10-16 NOTE — PROGRESS NOTES
1900  Bedside and Verbal shift change report given to [address], RN (oncoming nurse) by Sean Whitley RN (offgoing nurse). Report included the following information SBAR, Kardex, Intake/Output, MAR, Recent Results, and Cardiac Rhythm NSR/ST . 1915: pt tachycardic during the day. Dayshift RN, Sean Whitley, in contact with MD. MD gave orders to start metoprolol 12.5 mg q12h starting now. 2230: called into pt room to assess a newly noticed rash. Upon assessment, noticed hives on the inner/outer thigh and back of legs and his face is pretty red. Pt stating that the hives do not itch, burn, or hurt and that he just noticed them now. Pt only allergic to penicillins that he knows of. Received one dose of vancomycin at 1640 and one dose of cefepime at 1810. Notified MD. RRT RN also rounded on pt and assessed the hives. MD to review chart and place orders. 2245: MD to hold vancomycin and cefepime doses for tonight. Per MD, okay to give 0700 dose of flagyl. 0410: hives noted to have spread farther down both legs, onto both sides of abdomen, and a couple noticed on the arms. Pt still stating that the hives do not itch or hurt. Notified MD about hives spreading. MD to place orders, see MAR.     0503: critical troponin of 197 reported. Notified MD. Orders placed to drawn another troponin at 0800. This patient was assisted with Intentional Toileting every 2 hours during this shift as appropriate. Documentation of ambulation and output reflected on Flowsheet as appropriate. Purposeful hourly rounding was completed using AIDET and 5Ps. Outcomes of PHR documented as they occurred. Bed alarm in use as appropriate. Dual Suction and ambubag in place. 0700  Bedside and Verbal shift change report given to Rosemary Matute RN (oncoming nurse) by [address], RN (offgoing nurse). Report included the following information SBAR, Kardex, Intake/Output, MAR, Recent Results, and Cardiac Rhythm NSR/ST .

## 2022-10-16 NOTE — PROGRESS NOTES
0700  Bedside and Verbal shift change report given to Shorty Mclain (oncoming nurse) by Zahraa Ewing RN (offgoing nurse). Report included the following information SBAR, Kardex, Procedure Summary, Intake/Output, MAR, Accordion, and Recent Results. Initial assessment done. AOX4. Denies any pain. Will continue to monitor. Visit Vitals  /71 (BP 1 Location: Left upper arm, BP Patient Position: At rest)   Pulse (!) 107   Temp 99.2 °F (37.3 °C)   Resp 26   Ht 5' 10\" (1.778 m)   Wt 93 kg (205 lb)   SpO2 92%   BMI 29.41 kg/m²     1827  Patient getting tachycardic. Called RT to check on patient's own cpap. O2 desating to 88%. RT added supplement O2 to the CPAP. Dr. Matthew Quintero ordered STAT chest xray and EKG. 1900  Bedside and Verbal shift change report given to Peng Rivera (oncoming nurse) by Mary Valiente RN (offgoing nurse). Report included the following information SBAR, Kardex, Procedure Summary, Intake/Output, MAR, Accordion, Recent Results, and Laverle Cancel  Dr. Matthew Quintero ordered to start metoprolol 12.5 mg PO every 12 hrs include now.

## 2022-10-16 NOTE — PROGRESS NOTES
[facility] Pharmacy Dosing Services: Antimicrobial Stewardship Daily Doc    Consult for antibiotic dosing of Vancomycin/Cefepime/Flagyl by Dr. Lucía Llamas  Indication: Severe sepsis; Patient had abscessed tooth pulled 10 days ago. He was prescribed 5-day course of antibiotics. Developed fever after completing his antibiotics. He saw his PCP 3 days ago and was told WBC were elevated. Was prescribed a 10-day course of doxycycline. Fever continued until yesterday. Day of Therapy: 1    Ht Readings from Last 1 Encounters:   10/15/22 177.8 cm (70\")        Wt Readings from Last 1 Encounters:   10/15/22 93 kg (205 lb)      Vancomycin therapy:  Loading dose: 2250mg IV x 1   Maintenance dose: 1250mg IV every 24 hours; With improvement in ALYSHA (SCr 1.51-->0.94), change dose to 1000mg IV every 12 hours for predicted AUC of 506  Last level: N/A - new start   Dose calculated to approximate a           a. Target AUC/MIKHAIL of 400-600          b. Trough of: N/A      Assess/Plan: WBC improving; Temp curve better; Afeb since last pm. Hypotension improved; ALYSHA improved SCr 1.51-->0.94; As a result, increasing Vanc to 1g IV every 12 hours; Draw random Vanc level within 24-48 hours of first maint dose - not ordered. Dose administration notes:       Date Dose & Interval Measured (mcg/mL) Extrapolated (mcg/mL)   ? ? ? ?   ? ? ? ?   ? ? ? ? Non-Kinetic Antimicrobial Dosing Regimen:   Current Regimen:    Cefepime 2g IV every 12 hours (Approp for eCrCl ~54ml/min)  Flagyl 500mg IV every 12 hours   Recommendation: Increase Cefepime to 2g IV every 8hrs for CrCl >60  Dose administration notes: Other Antimicrobial   (not dosed by pharmacist) None   Cultures 10/15 Blood - pending  10/15 Urine - pending    Serum Creatinine Lab Results   Component Value Date/Time    Creatinine 0.94 10/16/2022 07:30 AM         Creatinine Clearance Estimated Creatinine Clearance: 87.4 mL/min (based on SCr of 0.94 mg/dL).      Temp Temp: 99.2 °F (37.3 °C)       WBC Lab Results   Component Value Date/Time    WBC 9.5 10/16/2022 07:30 AM        Procalcitonin Lab Results   Component Value Date/Time    Procalcitonin 0.15 10/15/2022 03:48 PM        For Antifungals, Metronidazole and Nafcillin: Lab Results   Component Value Date/Time    ALT (SGPT) 98 (H) 10/16/2022 07:30 AM    AST (SGOT) 61 (H) 10/16/2022 07:30 AM    Alk.  phosphatase 79 10/16/2022 07:30 AM    Bilirubin, total 0.5 10/16/2022 07:30 AM        Pharmacist Moira Fam

## 2022-10-16 NOTE — PROGRESS NOTES
2115 Received report from ED nurse. Pt. Received 4 boluses due to low b/p. B/p is now 84'R systolic and map above 60. Will monitor closely. Pt. Is afebrile, no c/o pain or discomfort. Able to ambulate without dizziness or difficulty. 0300 Lactate down to 1.3.

## 2022-10-16 NOTE — PROGRESS NOTES
OCCUPATIONAL THERAPY EVALUATION/DISCHARGE  Patient: Marylin Leach (23 y.o. male)  Date: 10/16/2022  Primary Diagnosis: Severe sepsis ([facility] [address] [A41.9, R65.20]       Precautions:       ASSESSMENT  Based on the objective data described below, the patient presents with good ADL performance following admission for fever, chills, and potential seizure activity, found to have sepsis after recent abscess tooth extraction. CT head negative for acute abnormality and pt is cleared to participate with OT. Today he is pleasant, alert, oriented, and able to participate in serial tasks, both seated and in standing. He demonstrates intact and equal UE coordination, ROM, and strength to perform serial ADLs and has no LOB during standing portions of tasks. Pt is able to manage all ADL containers and retrieves ADL items from varying heights, including off the floor. Vitals stable throughout session and pt denies symptoms of lightheadedness/dizziness. He reports he has been up to bathroom to perform ADLs periodically with RN present to manage lines/leads and denies concerns regarding returning home to normal self-care routine. Supportive spouse present. No further skilled acute OT services indicated at this time. Current Level of Function (ADLs/self-care): overall MOD I to supervision for ADLs    Functional Outcome Measure: The patient scored Total A-D  Total A-D (Motor Function): 66/66 on the Fugl-Holloway Assessment which is indicative of no impairment in upper extremity functional status. Other factors to consider for discharge: independent PLOF; lives with wife     PLAN :  Recommendation for discharge: (in order for the patient to meet his/her long term goals)  No skilled occupational therapy/ follow up rehabilitation needs identified at this time.     This discharge recommendation:  Has been made in collaboration with the attending provider and/or case management    IF patient discharges home will need the following DME:none SUBJECTIVE:   Patient stated I definitely feel better than yesterday.     OBJECTIVE DATA SUMMARY:   HISTORY:   Past Medical History:   Diagnosis Date    DM type 2 (diabetes mellitus, type 2) ([facility] [address]     Hypertension     Prostate cancer ([facility] [address]    No past surgical history on file. Prior Level of Function/Environment/Context: independent; retired; lives with wife; drives; no DME use  Expanded or extensive additional review of patient history:     Home Situation  Home Environment: Private residence  One/Two Story Residence: One story  Living Alone: No  Support Systems: Spouse/Significant Other  Patient Expects to be Discharged to[de-identified] Home  Current DME Used/Available at Home: None    Hand dominance: Right    EXAMINATION OF PERFORMANCE DEFICITS:  Cognitive/Behavioral Status:  Neurologic State: Alert  Orientation Level: Oriented X4  Cognition: Appropriate decision making; Appropriate for age attention/concentration; Appropriate safety awareness; Follows commands  Perception: Appears intact  Perseveration: No perseveration noted  Safety/Judgement: Awareness of environment; Fall prevention;Good awareness of safety precautions; Insight into deficits;Home safety    Hearing: Auditory  Auditory Impairment: None    Vision/Perceptual:    Tracking: Able to track stimulus in all quadrants w/o difficulty    Visual Fields:  (WFLs)  Diplopia: No      Range of Motion:  AROM: Within functional limits  PROM: Within functional limits    Strength:  Strength: Within functional limits    Coordination:  Coordination: Within functional limits  Fine Motor Skills-Upper: Left Intact; Right Intact    Gross Motor Skills-Upper: Left Intact; Right Intact    Tone:  Tone: Normal    Balance:  Sitting: Intact  Standing: Intact    Functional Mobility and Transfers for ADLs:  Bed Mobility:  Rolling: Independent  Supine to Sit: Independent  Sit to Supine: Independent  Scooting: Independent    Transfers:  Sit to Stand: Modified independent  Stand to Sit: Modified independent  Bed to Chair: Supervision  Bathroom Mobility: Supervision/set up  Toilet Transfer : Modified independent    ADL Assessment:  Feeding: Independent    Oral Facial Hygiene/Grooming: Modified Independent    Bathing: Supervision    Upper Body Dressing: Independent    Lower Body Dressing: Modified independent    Toileting: Modified independent    ADL Intervention and task modifications:    Cognitive Retraining  Safety/Judgement: Awareness of environment; Fall prevention;Good awareness of safety precautions; Insight into deficits;Home safety    Functional Measure:  Fugl-Holloway Assessment of Motor Recovery after Stroke:        Reflex Activity  Flexors/Biceps/Fingers: Can be elicited  Extensors/Triceps: Can be elicited  Reflex Subtotal: 4    Volitional Movement Within Synergies  Shoulder Retraction: Full  Shoulder Elevation: Full  Shoulder Abduction (90 degrees): Full  Shoulder External Rotation: Full  Elbow Flexion: Full  Forearm Supination: Full  Shoulder Adduction/Internal Rotation: Full  Elbow Extension: Full  Forearm Pronation: Full  Subtotal: 18    Volitional Movement Mixing Synergies  Hand to Lumbar Spine: Full  Shoulder Flexion (0-90 degrees): Full  Pronation-Supination: Full  Subtotal: 6    Volitional Movement With Little or No Synergy  Shoulder Abduction (0-90 degrees): Full  Shoulder Flexion ( degrees): Full  Pronation/Supination: Full  Subtotal : 6    Normal Reflex Activity  Biceps, Triceps, Finger Flexors:  Full  Subtotal : 2    Upper Extremity Total   Upper Extremity Total: 36    Wrist  Stability at 15 Degree Dorsiflexion: Full  Repeated Dorsiflexion/ Volar Flexion: Full  Stability at 15 Degree Dorsiflexion: Full  Repeated Dorsiflexion/ Volar Flexion: Full  Circumduction: Full  Wrist Total: 10    Hand  Mass Flexion: Full  Mass Extension: Full  Grasp A: Full  Grasp B: Full  Grasp C: Full  Grasp D: Full  Grasp E: Full  Hand Total: 14    Coordination/Speed  Tremor: None  Dysmetria: None  Time: <1s  Coordination/Speed Total : 6    Total A-D  Total A-D (Motor Function): 66/66     This is a reliable/valid measure of arm function after a neurological event. It has established value to characterize functional status and for measuring spontaneous and therapy-induced recovery; tests proximal and distal motor functions. Fugl-Holloway Assessment - UE scores recorded between five and 30 days post neurologic event can be used to predict UE recovery at six months post neurologic event. Severe = 0-21 points   Moderately Severe = 22-33 points   Moderate = 34-47 points   Mild = 48-66 points  SEBASTIÁN Purdy, STEPHANIE Porter, & PACO Fan (1992). Measurement of motor recovery after stroke: Outcome assessment and sample size requirements.  Stroke, 23, pp. 0166-7949.   ------------------------------------------------------------------------------------------------------------------------------------------------------------------  MCID:  Stroke:   Lauren Kelly et al, 2001; n = 171; mean age 79 (6) years; assessed within 16 (12) days of stroke, Acute Stroke)  FMA Motor Scores from Admission to Discharge   10 point increase in FMA Upper Extremity = 1.5 change in discharge FIM   10 point increase in FMA Lower Extremity = 1.9 change in discharge FIM  MDC:   Stroke:   Todd Frausto et al, 2008, n = 14, mean age = 59.9 (14.6) years, assessed on average 14 (6.5) months post stroke, Chronic Stroke)   FMA = 5.2 points for the Upper Extremity portion of the assessment     Occupational Therapy Evaluation Charge Determination   History Examination Decision-Making   LOW Complexity : Brief history review  LOW Complexity : 1-3 performance deficits relating to physical, cognitive , or psychosocial skils that result in activity limitations and / or participation restrictions  LOW Complexity : No comorbidities that affect functional and no verbal or physical assistance needed to complete eval tasks       Based on the above components, the patient evaluation is determined to be of the following complexity level: LOW   Pain Rating:  Pt reporting minimal pain    Activity Tolerance:   Good and SpO2 stable on RA    After treatment patient left in no apparent distress:    Supine in bed, Call bell within reach, Caregiver / family present, and Side rails x 3    COMMUNICATION/EDUCATION:   The patients plan of care was discussed with: Physical therapist and Registered nurse.      Thank you for this referral.  Mary Kay Stewart OT  Time Calculation: 13 mins

## 2022-10-16 NOTE — PROGRESS NOTES
Venkat Dobson [facility] [address], [place], [address] [facility]  [phone]      Hospitalist Progress Note      NAME: Marissa Norman   :  1955  MRM:  [id number]    Date/Time of service: 10/16/2022  11:03 AM       Subjective:     Chief Complaint:  Patient was personally seen and examined by me during this time period. Chart reviewed. No further fevers/chills       Objective:       Vitals:       Last 24hrs VS reviewed since prior progress note.  Most recent are:    Visit Vitals  /65 (BP 1 Location: Left upper arm, BP Patient Position: At rest)   Pulse 95   Temp 98.6 °F (37 °C)   Resp 26   Ht 5' 10\" (1.778 m)   Wt 93 kg (205 lb)   SpO2 92%   BMI 29.41 kg/m²     SpO2 Readings from Last 6 Encounters:   10/16/22 92%    O2 Flow Rate (L/min): 2 l/min     Intake/Output Summary (Last 24 hours) at 10/16/2022 1103  Last data filed at 10/16/2022 4510  Gross per 24 hour   Intake 280 ml   Output 750 ml   Net -470 ml        Exam:     Physical Exam:    Gen:  Well-developed, well-nourished, morbidly obese, in no acute distress  HEENT:  Pink conjunctivae, PERRL, hearing intact to voice, moist mucous membranes  Neck:  Supple, without masses, thyroid non-tender  Resp:  No accessory muscle use, clear breath sounds without wheezes rales or rhonchi  Card:  No murmurs, normal S1, S2 without thrills, bruits or peripheral edema  Abd:  Soft, non-tender, non-distended, normoactive bowel sounds are present  Musc:  No cyanosis or clubbing  Skin:  No rashes   Neuro:  Cranial nerves 3-12 are grossly intact, follows commands appropriately  Psych:  Good insight, oriented to person, place and time, alert    Medications Reviewed: (see below)    Lab Data Reviewed: (see below)    ______________________________________________________________________    Medications:     Current Facility-Administered Medications   Medication Dose Route Frequency    aspirin delayed-release tablet 81 mg  81 mg Oral DAILY    latanoprost (XALATAN) 0.005 % ophthalmic solution 1 Drop  1 Drop Both Eyes QHS    timolol (TIMOPTIC) 0.5 % ophthalmic solution 1 Drop  1 Drop Both Eyes DAILY    sodium chloride (NS) flush 5-40 mL  5-40 mL IntraVENous Q8H    sodium chloride (NS) flush 5-40 mL  5-40 mL IntraVENous PRN    0.9% sodium chloride infusion 25 mL  25 mL IntraVENous PRN    acetaminophen (TYLENOL) tablet 650 mg  650 mg Oral Q6H PRN    Or    acetaminophen (TYLENOL) suppository 650 mg  650 mg Rectal Q6H PRN    senna-docusate (PERICOLACE) 8.6-50 mg per tablet 1 Tablet  1 Tablet Oral BID    bisacodyL (DULCOLAX) suppository 10 mg  10 mg Rectal DAILY PRN    promethazine (PHENERGAN) tablet 12.5 mg  12.5 mg Oral Q6H PRN    Or    ondansetron (ZOFRAN) injection 4 mg  4 mg IntraVENous Q6H PRN    enoxaparin (LOVENOX) injection 40 mg  40 mg SubCUTAneous DAILY    HYDROmorphone (DILAUDID) syringe 0.5 mg  0.5 mg IntraVENous Q4H PRN    insulin lispro (HUMALOG) injection   SubCUTAneous AC&HS    glucose chewable tablet 16 g  4 Tablet Oral PRN    glucagon (GLUCAGEN) injection 1 mg  1 mg IntraMUSCular PRN    sodium chloride (NS) flush 5-40 mL  5-40 mL IntraVENous Q8H    sodium chloride (NS) flush 5-40 mL  5-40 mL IntraVENous PRN    0.9% sodium chloride with KCl 20 mEq/L infusion   IntraVENous CONTINUOUS    vancomycin (VANCOCIN) 1,250 mg in 0.9% sodium chloride 250 mL ([id number])  1,250 mg IntraVENous Q24H    cefepime (MAXIPIME) 2 g in 0.9% sodium chloride (MBP/ADV) 100 mL MBP  2 g IntraVENous Q12H    metroNIDAZOLE (FLAGYL) IVPB premix 500 mg  500 mg IntraVENous Q12H          Lab Review:     Recent Labs     10/16/22  0730 10/15/22  1548   WBC 9.5 13.2*   HGB 10.6* 12.5   HCT 32.2* 37.9    297     Recent Labs     10/16/22  0730 10/15/22  1548    130*   K 3.3* 3.4*    95*   CO2 23 27   * 189*   BUN 11 22*   CREA 0.94 1.51*   CA 8.2* 9.1   MG 2.2  --    PHOS 1.9*  --    ALB 2.6* 3.3*   TBILI 0.5 0.6   ALT 98* 56   INR 1.0  --      Lab Results   Component Value Date/Time    Glucose (POC) 112 10/16/2022 07:52 AM    Glucose (POC) 170 (H) 10/15/2022 07:51 PM          Assessment / Plan:     80 yo hx of HTN, DM, prostate CA, recent tooth abscess, presented w/ fevers, rigors, severe sepsis     1) Severe sepsis: sepsis POA, now improving. Unclear source. Hx of recent tooth abscess. Face/maxillary CT neg for abscess. Blood Cx neg so far. Viral panel neg. Empirically on IV Vanc/cefepime/flagyl     2) Hypotension: due to sepsis, now resolved. Will monitor closely      3) Seizure-like activities: reported by ED, but symptoms were consistent with rigors, fevers, sepsis. No evidence of seizures. Head CT pending. Will monitor      4) ALYSHA: now resolved with IVF. Due to sepsis, dehydration. Monitor BMP     5) HTN: hold norvasc, HCTZ, lisinopril due to hypotension, ALYSHA     6) DM type 2: A1C pending. Hold metformin, glipizide.   Start SSI    Total time spent with patient care: 39 Minutes **I personally saw and examined the patient during this time period**                 Care Plan discussed with: Patient, nursing, family     Discussed:  Care Plan    Prophylaxis:  Lovenox    Disposition:  Home w/Family           ___________________________________________________    Attending Physician: Kalen Palomino MD

## 2022-10-16 NOTE — PROGRESS NOTES
Reason for Admission:  severe sepsis  Patient has a past medical history of HTN, DM, prostate CA, recent tooth abscess, presented w/ fevers, rigors, severe sepsis. RUR Score:       6%              Plan for utilizing home health:     none     PCP: First and Last name:  Dafne Oates MD     Name of Practice:    Are you a current patient: Yes/No:   yes    Approximate date of last visit:  July 2022   Can you participate in a virtual visit with your PCP:  yes                    Current Advanced Directive/Advance Care Plan: Full Code  He has an AD at home. Healthcare Decision Maker:   Click here to complete [address] including selection of the Healthcare Decision Maker Relationship (ie \"Primary\")                             Transition of Care Plan:                    Met with pt and his wife for d/c planning. Pt lives with his wife in a 1 story house with 3 entry steps. Pt stated he is independent with ADL's, drives and uses no DME for ambulation. He as Cpap at home from [facility]. Medications are from [facility]. PT/OT following--no needs  Pt is under medical management  CM following for any d/c needs  Pt's spouse to transport him home at d/c  Pt to follow up OP    Care Management Interventions  PCP Verified by CM: Yes  Mode of Transport at Discharge: Other (see comment)  Transition of Care Consult (CM Consult): Discharge Planning  Physical Therapy Consult: Yes  Occupational Therapy Consult: Yes  Support Systems: Spouse/Significant Other, Child(maged)  Confirm Follow Up Transport: Family  Discharge Location  Patient Expects to be Discharged to[de-identified] Home with family assistance  FELICITAS Ferris

## 2022-10-16 NOTE — PROGRESS NOTES
Problem: General Medical Care Plan  Goal: *Vital signs within specified parameters  Outcome: Progressing Towards Goal  Goal: *Labs within defined limits  Outcome: Progressing Towards Goal  Goal: *Absence of infection signs and symptoms  Outcome: Progressing Towards Goal  Goal: *Optimal pain control at patient's stated goal  Outcome: Progressing Towards Goal  Goal: *Skin integrity maintained  Outcome: Progressing Towards Goal  Goal: *Fluid volume balance  Outcome: Progressing Towards Goal     Problem: Patient Education: Go to Patient Education Activity  Goal: Patient/Family Education  Outcome: Progressing Towards Goal     Problem: Sepsis: Day of Diagnosis  Goal: Off Pathway (Use only if patient is Off Pathway)  Outcome: Progressing Towards Goal  Goal: *Fluid resuscitation  Outcome: Progressing Towards Goal  Goal: *Paired blood cultures prior to first dose of antibiotic  Outcome: Progressing Towards Goal  Goal: *First dose of  appropriate antibiotic within 3 hours of arrival to ED, within 1 hour of arrival to ICU  Outcome: Progressing Towards Goal  Goal: *Lactic acid with first set of blood cultures  Outcome: Progressing Towards Goal  Goal: *Pneumococcal immunization (if eligible)  Outcome: Progressing Towards Goal  Goal: *Influenza immunization (if eligible)  Outcome: Progressing Towards Goal  Goal: Activity/Safety  Outcome: Progressing Towards Goal  Goal: Consults, if ordered  Outcome: Progressing Towards Goal  Goal: Diagnostic Test/Procedures  Outcome: Progressing Towards Goal  Goal: Nutrition/Diet  Outcome: Progressing Towards Goal  Goal: Discharge Planning  Outcome: Progressing Towards Goal  Goal: Medications  Outcome: Progressing Towards Goal  Goal: Respiratory  Outcome: Progressing Towards Goal  Goal: Treatments/Interventions/Procedures  Outcome: Progressing Towards Goal  Goal: Psychosocial  Outcome: Progressing Towards Goal

## 2022-10-16 NOTE — PROGRESS NOTES
RRT evaluation:    MEWS 4  Sepsis Score 37  Deterioration Index 58    Pt resting comfortably in bed with no s/s of distress. Denies CP or SOB. Hypotensive with MAP of 68 after receiving 4000 mL NS in ED. Will continue to monitor.      Rashaun Dacosta RN

## 2022-10-17 ENCOUNTER — APPOINTMENT (OUTPATIENT)
Dept: GENERAL RADIOLOGY | Age: 67
DRG: 864 | End: 2022-10-17
Attending: HOSPITALIST
Payer: MEDICARE

## 2022-10-17 ENCOUNTER — APPOINTMENT (OUTPATIENT)
Dept: CT IMAGING | Age: 67
DRG: 864 | End: 2022-10-17
Attending: INTERNAL MEDICINE
Payer: MEDICARE

## 2022-10-17 LAB
ALBUMIN SERPL-MCNC: 2.7 G/DL (ref 3.5–5)
ALBUMIN/GLOB SERPL: 0.6 {RATIO} (ref 1.1–2.2)
ALP SERPL-CCNC: 89 U/L (ref 45–117)
ALT SERPL-CCNC: 87 U/L (ref 12–78)
ANION GAP SERPL CALC-SCNC: 8 MMOL/L (ref 5–15)
AST SERPL-CCNC: 33 U/L (ref 15–37)
BILIRUB SERPL-MCNC: 0.6 MG/DL (ref 0.2–1)
BNP SERPL-MCNC: 5803 PG/ML
BUN SERPL-MCNC: 7 MG/DL (ref 6–20)
BUN/CREAT SERPL: 8 (ref 12–20)
CALCIUM SERPL-MCNC: 8.6 MG/DL (ref 8.5–10.1)
CHLORIDE SERPL-SCNC: 102 MMOL/L (ref 97–108)
CO2 SERPL-SCNC: 26 MMOL/L (ref 21–32)
CREAT SERPL-MCNC: 0.83 MG/DL (ref 0.7–1.3)
ERYTHROCYTE [DISTWIDTH] IN BLOOD BY AUTOMATED COUNT: 14 % (ref 11.5–14.5)
GLOBULIN SER CALC-MCNC: 4.5 G/DL (ref 2–4)
GLUCOSE BLD STRIP.AUTO-MCNC: 124 MG/DL (ref 65–117)
GLUCOSE BLD STRIP.AUTO-MCNC: 131 MG/DL (ref 65–117)
GLUCOSE BLD STRIP.AUTO-MCNC: 151 MG/DL (ref 65–117)
GLUCOSE BLD STRIP.AUTO-MCNC: 153 MG/DL (ref 65–117)
GLUCOSE SERPL-MCNC: 141 MG/DL (ref 65–100)
HCT VFR BLD AUTO: 36 % (ref 36.6–50.3)
HGB BLD-MCNC: 11.7 G/DL (ref 12.1–17)
MAGNESIUM SERPL-MCNC: 2.2 MG/DL (ref 1.6–2.4)
MCH RBC QN AUTO: 26.4 PG (ref 26–34)
MCHC RBC AUTO-ENTMCNC: 32.5 G/DL (ref 30–36.5)
MCV RBC AUTO: 81.1 FL (ref 80–99)
NRBC # BLD: 0 K/UL (ref 0–0.01)
NRBC BLD-RTO: 0 PER 100 WBC
PHOSPHATE SERPL-MCNC: 2.2 MG/DL (ref 2.6–4.7)
PLATELET # BLD AUTO: 339 K/UL (ref 150–400)
PMV BLD AUTO: 10.2 FL (ref 8.9–12.9)
POTASSIUM SERPL-SCNC: 3.2 MMOL/L (ref 3.5–5.1)
PROT SERPL-MCNC: 7.2 G/DL (ref 6.4–8.2)
RBC # BLD AUTO: 4.44 M/UL (ref 4.1–5.7)
SERVICE CMNT-IMP: ABNORMAL
SODIUM SERPL-SCNC: 136 MMOL/L (ref 136–145)
TROPONIN-HIGH SENSITIVITY: 166 NG/L (ref 0–76)
TROPONIN-HIGH SENSITIVITY: 197 NG/L (ref 0–76)
TROPONIN-HIGH SENSITIVITY: 90 NG/L (ref 0–76)
TROPONIN-HIGH SENSITIVITY: 94 NG/L (ref 0–76)
TSH SERPL DL<=0.05 MIU/L-ACNC: 1.48 UIU/ML (ref 0.36–3.74)
WBC # BLD AUTO: 14.8 K/UL (ref 4.1–11.1)

## 2022-10-17 PROCEDURE — 74011250636 HC RX REV CODE- 250/636: Performed by: HOSPITALIST

## 2022-10-17 PROCEDURE — 93005 ELECTROCARDIOGRAM TRACING: CPT

## 2022-10-17 PROCEDURE — 99222 1ST HOSP IP/OBS MODERATE 55: CPT | Performed by: SPECIALIST

## 2022-10-17 PROCEDURE — 77010033678 HC OXYGEN DAILY

## 2022-10-17 PROCEDURE — 83735 ASSAY OF MAGNESIUM: CPT

## 2022-10-17 PROCEDURE — 71260 CT THORAX DX C+: CPT

## 2022-10-17 PROCEDURE — 84100 ASSAY OF PHOSPHORUS: CPT

## 2022-10-17 PROCEDURE — 65270000046 HC RM TELEMETRY

## 2022-10-17 PROCEDURE — 84484 ASSAY OF TROPONIN QUANT: CPT

## 2022-10-17 PROCEDURE — 74011000250 HC RX REV CODE- 250: Performed by: INTERNAL MEDICINE

## 2022-10-17 PROCEDURE — 97161 PT EVAL LOW COMPLEX 20 MIN: CPT

## 2022-10-17 PROCEDURE — 74011250637 HC RX REV CODE- 250/637: Performed by: HOSPITALIST

## 2022-10-17 PROCEDURE — 74011000258 HC RX REV CODE- 258: Performed by: INTERNAL MEDICINE

## 2022-10-17 PROCEDURE — 71045 X-RAY EXAM CHEST 1 VIEW: CPT

## 2022-10-17 PROCEDURE — 74011250636 HC RX REV CODE- 250/636: Performed by: INTERNAL MEDICINE

## 2022-10-17 PROCEDURE — 83880 ASSAY OF NATRIURETIC PEPTIDE: CPT

## 2022-10-17 PROCEDURE — 74011000250 HC RX REV CODE- 250: Performed by: HOSPITALIST

## 2022-10-17 PROCEDURE — APPSS30 APP SPLIT SHARED TIME 16-30 MINUTES: Performed by: NURSE PRACTITIONER

## 2022-10-17 PROCEDURE — 99223 1ST HOSP IP/OBS HIGH 75: CPT | Performed by: INTERNAL MEDICINE

## 2022-10-17 PROCEDURE — 80053 COMPREHEN METABOLIC PANEL: CPT

## 2022-10-17 PROCEDURE — 74011250637 HC RX REV CODE- 250/637: Performed by: INTERNAL MEDICINE

## 2022-10-17 PROCEDURE — 36415 COLL VENOUS BLD VENIPUNCTURE: CPT

## 2022-10-17 PROCEDURE — 85027 COMPLETE CBC AUTOMATED: CPT

## 2022-10-17 PROCEDURE — 84443 ASSAY THYROID STIM HORMONE: CPT

## 2022-10-17 PROCEDURE — 74011000636 HC RX REV CODE- 636: Performed by: INTERNAL MEDICINE

## 2022-10-17 PROCEDURE — 82962 GLUCOSE BLOOD TEST: CPT

## 2022-10-17 RX ORDER — DIPHENHYDRAMINE HCL 25 MG
25 CAPSULE ORAL ONCE
Status: COMPLETED | OUTPATIENT
Start: 2022-10-17 | End: 2022-10-17

## 2022-10-17 RX ORDER — BUMETANIDE 0.25 MG/ML
0.5 INJECTION INTRAMUSCULAR; INTRAVENOUS EVERY 12 HOURS
Status: DISCONTINUED | OUTPATIENT
Start: 2022-10-17 | End: 2022-10-19

## 2022-10-17 RX ORDER — LIDOCAINE 4 G/100G
1 PATCH TOPICAL EVERY 24 HOURS
Status: DISCONTINUED | OUTPATIENT
Start: 2022-10-17 | End: 2022-10-19 | Stop reason: HOSPADM

## 2022-10-17 RX ORDER — POTASSIUM CHLORIDE 750 MG/1
40 TABLET, FILM COATED, EXTENDED RELEASE ORAL 2 TIMES DAILY
Status: COMPLETED | OUTPATIENT
Start: 2022-10-17 | End: 2022-10-18

## 2022-10-17 RX ORDER — DIPHENHYDRAMINE HYDROCHLORIDE 50 MG/ML
25 INJECTION, SOLUTION INTRAMUSCULAR; INTRAVENOUS
Status: DISCONTINUED | OUTPATIENT
Start: 2022-10-17 | End: 2022-10-19 | Stop reason: HOSPADM

## 2022-10-17 RX ADMIN — METOPROLOL TARTRATE 12.5 MG: 25 TABLET, FILM COATED ORAL at 20:23

## 2022-10-17 RX ADMIN — SODIUM CHLORIDE, PRESERVATIVE FREE 10 ML: 5 INJECTION INTRAVENOUS at 17:24

## 2022-10-17 RX ADMIN — LATANOPROST 1 DROP: 50 SOLUTION/ DROPS OPHTHALMIC at 21:00

## 2022-10-17 RX ADMIN — HYDROMORPHONE HYDROCHLORIDE 0.5 MG: 1 INJECTION, SOLUTION INTRAMUSCULAR; INTRAVENOUS; SUBCUTANEOUS at 20:50

## 2022-10-17 RX ADMIN — BUMETANIDE 0.5 MG: 0.25 INJECTION INTRAMUSCULAR; INTRAVENOUS at 20:23

## 2022-10-17 RX ADMIN — MEROPENEM 1 G: 1 INJECTION, POWDER, FOR SOLUTION INTRAVENOUS at 17:14

## 2022-10-17 RX ADMIN — IOPAMIDOL 100 ML: 755 INJECTION, SOLUTION INTRAVENOUS at 18:59

## 2022-10-17 RX ADMIN — TIMOLOL MALEATE 1 DROP: 5 SOLUTION/ DROPS OPHTHALMIC at 08:23

## 2022-10-17 RX ADMIN — ASPIRIN 81 MG: 81 TABLET, COATED ORAL at 08:22

## 2022-10-17 RX ADMIN — POTASSIUM CHLORIDE 40 MEQ: 750 TABLET, FILM COATED, EXTENDED RELEASE ORAL at 08:45

## 2022-10-17 RX ADMIN — BUMETANIDE 0.5 MG: 0.25 INJECTION INTRAMUSCULAR; INTRAVENOUS at 08:44

## 2022-10-17 RX ADMIN — METOPROLOL TARTRATE 12.5 MG: 25 TABLET, FILM COATED ORAL at 08:22

## 2022-10-17 RX ADMIN — FAMOTIDINE 20 MG: 10 INJECTION, SOLUTION INTRAVENOUS at 04:24

## 2022-10-17 RX ADMIN — DIPHENHYDRAMINE HYDROCHLORIDE 25 MG: 25 CAPSULE ORAL at 04:24

## 2022-10-17 RX ADMIN — POTASSIUM CHLORIDE 40 MEQ: 750 TABLET, FILM COATED, EXTENDED RELEASE ORAL at 17:13

## 2022-10-17 RX ADMIN — SODIUM CHLORIDE, PRESERVATIVE FREE 10 ML: 5 INJECTION INTRAVENOUS at 21:00

## 2022-10-17 RX ADMIN — ENOXAPARIN SODIUM 40 MG: 100 INJECTION SUBCUTANEOUS at 08:22

## 2022-10-17 RX ADMIN — DIATRIZOATE MEGLUMINE AND DIATRIZOATE SODIUM 30 ML: 660; 100 LIQUID ORAL; RECTAL at 17:13

## 2022-10-17 NOTE — PROGRESS NOTES
.0730- Bedside and Verbal shift change report given to Elizabeth Schaeffer RN (oncoming nurse) by Selena Richter RN (offgoing nurse). Report included the following information SBAR, Kardex, ED Summary, OR Summary, Procedure Summary, Intake/Output, MAR, Accordion, and Recent Results. This patient was assisted with Intentional Toileting every 2 hours during this shift. Documentation of ambulation and output reflected on Flowsheet.

## 2022-10-17 NOTE — PROGRESS NOTES
1900  Bedside and Verbal shift change report given to AMARILIS Ewing RN (oncoming nurse) by Dexter Mendosa RN (offgoing nurse). Report included the following information SBAR, Kardex, Intake/Output, MAR, Recent Results, and Cardiac Rhythm NSR/ST . 1936: pt returned from CT, complaining of chest aching, describes it as a discomforting feeling, and labored breathing. Pt placed onto 2 L nasal cannula. EKG obtained that showed sinus tachycardia. MD Vance Platt already on unit, will come and assess pt.      2020: MD to bedside. Orders placed for stat x-ray and to trend troponins x3. Verbal orders received to give bumex and dilaudid now. 9087: called into pt room to assess new rash. Hives noted to bilateral thighs, tops of feet, bilateral arms, and abdomen. Pt states they do not itch or hurt. Pt has received two doses of Meropenem. Administered PRN benadryl. Notified MD about pt status. MD stated to hold the next Meropenem dose and to let the dayshift team assess the rash and farther antibiotic use. 0550: while attempted to hold the 0900 Meropenem dose, 0900 metoprolol dose accidentally held instead. Called pharmacy who canceled this entry and placed a new metoprolol order to begin at 0900. Meropenem dose held for 0900. This patient was assisted with Intentional Toileting every 2 hours during this shift as appropriate. Documentation of ambulation and output reflected on Flowsheet as appropriate. Purposeful hourly rounding was completed using AIDET and 5Ps. Outcomes of PHR documented as they occurred. Bed alarm in use as appropriate. Dual Suction and ambubag in place. 0700  Bedside and Verbal shift change report given to STACY Haider (oncoming nurse) by AMARILIS Ewing RN (offgoing nurse). Report included the following information SBAR, Kardex, Intake/Output, MAR, Recent Results, and Cardiac Rhythm NSR/ST .

## 2022-10-17 NOTE — PROGRESS NOTES
Cardiology Initial Care Encounter    Patient: Samir Vasquez MRN: [id number]     YOB: 1955  Age: 79 y.o. Sex: male      Admit Date: 10/15/2022       Assessment/Plan     Elevated troponin: unclear why labs drawn - pt denies any CP, palpitations, SOB/ELLIOTT or edema. Troponin likely elevated due to demand ischemia in setting of sepsis. Trend labs. TTE pending - if echo normal, can likely cont workup as an OP    2. Elevated pBNP, mild pulm edema on xray: cont w/ conservative diuresis. Pt denies SOB. TTE pending - further workup pending results    3. Sepsis, recent tooth abscess: on abx, per primary team. UA likely contaminate     4. HTN: hypotensive on admit, BP improving     5. ST: likely r/t above, on metoprolol         NP spent 15 minutes reviewing chart, labs, diagnostics and coordination of care. NP spent 10 minutes with pt/family in examination and care plan review. HPI     Samir Vasquez is a 79 y.o.  male with PMH significant for HTN, DM, and prostate CA. Pt presented to the ED with complaints of fevers, chills, rigors and possible seizure like activity. Wife witnessed pt eyes rolled back in head and shaking for about 15 seconds. Associated w/ nausea and feeling lightheaded. He was recently diagnosed with a tooth abscess, which was removed, and was on antibiotics for 5 days after that. Symptoms began day after he completed his antibiotics. Last night pt complained of rash on his thighs. He had labs drawn which included a troponin and pBNP, however pt denies any CP, palpitations, SOB/ELLIOTT, orthopnea, PND or edema. He denies any cardiac history. The patient has been referred to cardiology for on going management of elevated troponin, pBNP.      Review of Symptoms:  Constitutional: negative  ENT: negative   Respiratory: negative  Gastrointestinal: negative  Genitourinary: no dysuria, hematuria, frequency   Musculoskeletal:negative  Neurological: negative  Other systems reviewed and negative except as above. Previous cardiac hx  No specialty comments available.   Risk factors: HTN, DM, male    Social History     Tobacco Use    Smoking status: Former     Types: Cigarettes    Smokeless tobacco: Never    Tobacco comments:     Quit decades ago   Substance Use Topics    Alcohol use: Yes     Family History   Problem Relation Age of Onset    Hypertension Father        Current Facility-Administered Medications   Medication Dose Route Frequency    potassium chloride SR (KLOR-CON 10) tablet 40 mEq  40 mEq Oral BID    diphenhydrAMINE (BENADRYL) injection 25 mg  25 mg IntraVENous Q4H PRN    bumetanide (BUMEX) injection 0.5 mg  0.5 mg IntraVENous Q12H    aspirin delayed-release tablet 81 mg  81 mg Oral DAILY    latanoprost (XALATAN) 0.005 % ophthalmic solution 1 Drop  1 Drop Both Eyes QHS    timolol (TIMOPTIC) 0.5 % ophthalmic solution 1 Drop  1 Drop Both Eyes DAILY    sodium chloride (NS) flush 5-40 mL  5-40 mL IntraVENous Q8H    sodium chloride (NS) flush 5-40 mL  5-40 mL IntraVENous PRN    0.9% sodium chloride infusion 25 mL  25 mL IntraVENous PRN    acetaminophen (TYLENOL) tablet 650 mg  650 mg Oral Q6H PRN    Or    acetaminophen (TYLENOL) suppository 650 mg  650 mg Rectal Q6H PRN    senna-docusate (PERICOLACE) 8.6-50 mg per tablet 1 Tablet  1 Tablet Oral BID    bisacodyL (DULCOLAX) suppository 10 mg  10 mg Rectal DAILY PRN    promethazine (PHENERGAN) tablet 12.5 mg  12.5 mg Oral Q6H PRN    Or    ondansetron (ZOFRAN) injection 4 mg  4 mg IntraVENous Q6H PRN    enoxaparin (LOVENOX) injection 40 mg  40 mg SubCUTAneous DAILY    HYDROmorphone (DILAUDID) syringe 0.5 mg  0.5 mg IntraVENous Q4H PRN    insulin lispro (HUMALOG) injection   SubCUTAneous AC&HS    glucose chewable tablet 16 g  4 Tablet Oral PRN    glucagon (GLUCAGEN) injection 1 mg  1 mg IntraMUSCular PRN    [Held by provider] vancomycin (VANCOCIN) 1,000 mg in 0.9% sodium chloride 250 mL (Xile5Hnc)  1,000 mg IntraVENous Q12H [Held by provider] cefepime (MAXIPIME) 2 g in 0.9% sodium chloride (MBP/ADV) 100 mL MBP  2 g IntraVENous Q8H    metoprolol tartrate (LOPRESSOR) tablet 12.5 mg  12.5 mg Oral Q12H    sodium chloride (NS) flush 5-40 mL  5-40 mL IntraVENous Q8H    sodium chloride (NS) flush 5-40 mL  5-40 mL IntraVENous PRN    [Held by provider] metroNIDAZOLE (FLAGYL) IVPB premix 500 mg  500 mg IntraVENous Q12H       Objective:     Vitals:    10/17/22 0423 10/17/22 0608 10/17/22 0700 10/17/22 0737   BP: 128/77   116/71   Pulse: (!) 102 88 87 98   Resp: 25   25   Temp: 98.7 °F (37.1 °C)   98.9 °F (37.2 °C)   SpO2: 93%   93%   Weight:       Height:            Intake and Output:  Current Shift: 10/17 0701 - 10/17 1900  In: -   Out: 550 [Urine:550]  Last three shifts: 10/15 1901 - 10/17 0700  In: 640 [P.O.:540; I.V.:100]  Out: 8815 [Urine:3175]          Gen: Well-developed, well-nourished, in no acute distress  Neck: Supple,No JVD, No Carotid Bruit,   Resp: No accessory muscle use, Clear breath sounds, No rales or rhonchi  Card: Regular Rate,Rhythm - ST, Normal S1, S2, No murmurs, rubs or gallop. No thrills. Abd:  Soft, non-tender, non-distended, BS+   MSK: No cyanosis  Skin: +rash to upper thighs  Neuro: moving all four extremities , follows commands appropriately  Psych:  Good insight, oriented to person, place , alert, Nml Affect  LE: No edema    EKG: sinus tachycardia. TELEMETRY ST/SR    Lab/Data Review: All lab results for the last 24 hours reviewed.      Signed By: Melanie Brennan NP     October 17, 2022

## 2022-10-17 NOTE — PROGRESS NOTES
Informed of suspected newly formed rash and hives thigh, back of the legs and face. Rash otherwise asymptomatic. Reviewed chart. Patient admitted with suspected severe sepsis of unknown source and has been on vancomycin cefepime and Flagyl. Suspect rash related to antibiotics. I will hold antibiotics vancomycin and cefepime for tonight; patient currently afebrile and normotensive. May consider antihistamine and further symptomatic therapy if becomes symptomatic. Day team will assess rash and decide on further antibiotic regimen.

## 2022-10-17 NOTE — PROGRESS NOTES
PHYSICAL THERAPY EVALUATION/DISCHARGE  Patient: Miranda Burger (43 y.o. male)  Date: 10/17/2022  Primary Diagnosis: Severe sepsis ([facility] [A41.9, R65.20]       Precautions: Universal         ASSESSMENT  Based on the objective data described below, the patient presents with independent transfers and flat surface ambulation on day 2 of admission with sepsis in setting of recent tooth abscess. He reports active, independent baseline, denies functional deficits, and scores within low fall risk category. Pt educated regarding symptom monitoring with activity and tolerates encounter without complaints. Functional Outcome Measure: The patient scored 28 on the Tinetti outcome measure which is indicative of low fall ris. Other factors to consider for discharge: none additional     Further skilled acute physical therapy is not indicated at this time. PLAN :  Recommendation for discharge: (in order for the patient to meet his/her long term goals)  No skilled physical therapy/ follow up rehabilitation needs identified at this time. This discharge recommendation:  Has not yet been discussed the attending provider and/or case management    IF patient discharges home will need the following DME: none       SUBJECTIVE:   Patient stated I'm getting around fine.     Pt received seated edge of bed, agreeable to PT and cleared by RN. OBJECTIVE DATA SUMMARY:   HISTORY:    Past Medical History:   Diagnosis Date    DM type 2 (diabetes mellitus, type 2) ([facility]     Hypertension     Prostate cancer ([facility]    No past surgical history on file.     Prior level of function: completely independent at community level  Personal factors and/or comorbidities impacting plan of care: as above    Home Situation  Home Environment: Private residence  One/Two Story Residence: One story  Living Alone: No  Support Systems: Spouse/Significant Other, Child(maged)  Patient Expects to be Discharged to[de-identified] Home with family assistance  Current DME Used/Available at Home: None    EXAMINATION/PRESENTATION/DECISION MAKING:   Critical Behavior:  Neurologic State: Alert  Orientation Level: Oriented X4  Cognition: Appropriate decision making, Follows commands  Safety/Judgement: Awareness of environment, Fall prevention, Good awareness of safety precautions, Insight into deficits, Home safety  Hearing: Auditory  Auditory Impairment: None  Skin:  LE exposed skin intact; +wired telemetry and pulse ox  Edema: none noted LEs  Range Of Motion:      WFL BLEs                    Strength:     WFL BLEs                 Tone & Sensation:             Normal LE tone                     Coordination:   WFL       Functional Mobility:  Bed Mobility:   Performed with OT yesterday at independent level           Transfers:  Sit to Stand: Independent  Stand to Sit: Independent        Bed to Chair: Independent              Balance:    Sitting: good static and dynamic  Standing: good static and dynamic  Ambulation/Gait Training:  Distance (ft): 20 Feet (ft)     Ambulation - Level of Assistance: Independent                                             RN reports pt has been ambulatory in room without difficulty. Functional Measure:  Tinetti test:    Sitting Balance: 1  Arises: 2  Attempts to Rise: 2  Immediate Standing Balance: 2  Standing Balance: 2  Nudged: 2  Eyes Closed: 1  Turn 360 Degrees - Continuous/Discontinuous: 1  Turn 360 Degrees - Steady/Unsteady: 1  Sitting Down: 2  Balance Score: 16 Balance total score  Indication of Gait: 1  R Step Length/Height: 1  L Step Length/Height: 1  R Foot Clearance: 1  L Foot Clearance: 1  Step Symmetry: 1  Step Continuity: 1  Path: 2  Trunk: 2  Walking Time: 1  Gait Score: 12 Gait total score  Total Score: 28/28 Overall total score         Tinetti Tool Score Risk of Falls  <19 = High Fall Risk  19-24 = Moderate Fall Risk  25-28 = Low Fall Risk  Loretta ALEXIS.  Performance-Oriented Assessment of Mobility Problems in Elderly Patients. [place]; [id number]. (Scoring Description: PT Bulletin Feb. 10, 1993)    Older adults: Valentina Joaquin et al, 2009; n = 1000 [facility] elderly evaluated with ABC, IVON, ADL, and IADL)  · Mean IVON score for males aged 69-68 years = 26.21(3.40)  · Mean IVON score for females age 69-68 years = 25.16(4.30)  · Mean IVON score for males over 80 years = 23.29(6.02)  · Mean IVON score for females over 80 years = 17.20(8.32)            Physical Therapy Evaluation Charge Determination   History Examination Presentation Decision-Making   HIGH Complexity :3+ comorbidities / personal factors will impact the outcome/ POC  HIGH Complexity : 4+ Standardized tests and measures addressing body structure, function, activity limitation and / or participation in recreation  LOW Complexity : Stable, uncomplicated  Other outcome measures tinetti  LOW       Based on the above components, the patient evaluation is determined to be of the following complexity level: LOW     Pain Rating:  Denies pain    Activity Tolerance:   Good      After treatment patient left in no apparent distress:   Call bell within reach and Side rails x 3; sitting edge of bed as found; lines/leads intact    COMMUNICATION/EDUCATION:   The patients plan of care was discussed with: Occupational therapist and Registered nurse. Fall prevention education was provided and the patient/caregiver indicated understanding., Patient/family have participated as able in goal setting and plan of care. , and Patient/family agree to work toward stated goals and plan of care.     Thank you for this referral.  Kezia Robertson, PT, DPT   Time Calculation: 8 mins

## 2022-10-17 NOTE — CONSULTS
Infectious Disease Consult    Impression/Plan   Febrile illness with leukocytosis. Status post recent tooth extraction. Etiology unclear. Maxillofacial CT unremarkable. No obvious evidence infection on exam.  Patient with no other specific complaints. Blood cultures sterile to date. Urine culture sterile. Chest x-ray negative for infiltrate. I am reluctant to monitor the patient off of antibiotics in light of significant fever and leukocytosis at time of admission. Antibiotics will be changed to meropenem. Will check CT of the abdomen pelvis in light of elevated transaminases. Further recommendations pending results of above  Rash. Suspect antibiotic related. Resolving. He was on vancomycin, cefepime, and meropenem at the time the rash developed. Rash is likely related to cefepime. Patient may benefit from outpatient follow-up with an allergist for antibiotic allergy testing  History of penicillin allergy    Anti-infectives:   None    Subjective:   Date of Consultation:  October 17, 2022  Date of Admission: 10/15/2022   Referring Physician:     Patient is a 79 y.o. male with a past medical history significant for diabetes mellitus, hypertension, and prostate cancer who is being seen for fevers. Patient underwent tooth extraction on October 5, 2022. The tooth was found to be abscessed and he was treated with a course of clindamycin x5 days. Less than 12 hours after completing the antibiotics he began to develop fevers. He was seen by PCP the next day. White count was elevated and he was given a 10-day course of doxycycline. He took approximately 4 doses of the doxycycline when he developed severe shaking chills and decreased responsiveness. Initial work-up in the emergency department revealed patient to be febrile to 102.5 °F with a white blood cell count of 13,000. Patient's hospital course has been complicated by development of a rash thought to be due to the antibiotics.   Work-up for infectious cause of fever and leukocytosis has been unrevealing to date. Patient is not currently on any antibiotics. The infectious diseases service has been asked to assist with antibiotic management    Patient Active Problem List   Diagnosis Code    Severe sepsis ([facility] A41.9, R65.20    Hypotension I95.9    ALYSHA (acute kidney injury) ([facility] N17.9    DM type 2 (diabetes mellitus, type 2) ([facility] E11.9     Past Medical History:   Diagnosis Date    DM type 2 (diabetes mellitus, type 2) ([facility]     Hypertension     Prostate cancer ([facility]       Family History   Problem Relation Age of Onset    Hypertension Father       Social History     Tobacco Use    Smoking status: Former     Types: Cigarettes    Smokeless tobacco: Never    Tobacco comments:     Quit decades ago   Substance Use Topics    Alcohol use: Yes     Past Surgical History:   Procedure Laterality Date    HX APPENDECTOMY      HX PROSTATECTOMY        Prior to Admission medications    Medication Sig Start Date End Date Taking? Authorizing Provider   hydroCHLOROthiazide (HYDRODIURIL) 25 mg tablet Take 25 mg by mouth daily. Yes Provider, Historical   amLODIPine (NORVASC) 10 mg tablet Take 10 mg by mouth daily. Yes Provider, Historical   lisinopriL (PRINIVIL, ZESTRIL) 5 mg tablet Take 5 mg by mouth two (2) times a day. Yes Provider, Historical   glipiZIDE (GLUCOTROL) 10 mg tablet Take 10 mg by mouth Daily (before breakfast). Yes Provider, Historical   metFORMIN (GLUCOPHAGE) 500 mg tablet Take 500 mg by mouth two (2) times daily (with meals). Yes Provider, Historical   aspirin delayed-release 81 mg tablet Take 81 mg by mouth daily. Yes Provider, Historical   latanoprost (XALATAN) 0.005 % ophthalmic solution Administer 1 Drop to both eyes nightly. Yes Provider, Historical   timoloL maleate 0.5 % drpd ophthalmic solution Administer 1 Drop to both eyes daily.    Yes Provider, Historical   sildenafil citrate (VIAGRA) 100 mg tablet Take 25 mg by mouth daily as needed for Erectile Dysfunction. Provider, Historical     Allergies   Allergen Reactions    Penicillins Hives        Review of Systems:  A comprehensive review of systems was negative except for that written in the History of Present Illness. Objective:   Blood pressure 138/82, pulse (!) 107, temperature 98.2 °F (36.8 °C), resp. rate 18, height 5' 10\" (1.778 m), weight 210 lb 8.6 oz (95.5 kg), SpO2 94 %. Temp (24hrs), Av.8 °F (37.1 °C), Min:98.2 °F (36.8 °C), Max:99.4 °F (37.4 °C)       Exam:    General:  Alert, cooperative, well noursished, well developed, appears stated age   Eyes:  Sclera anicteric.     Mouth/Throat: Mucous membranes normal.  Left lower rear tooth extraction site unremarkable   Neck: Supple   Lungs:   Clear to auscultation    CV:  Regular rate and rhythm   Abdomen:   Soft, non-tender, nondistended   Extremities: No edema   Skin: Morbilliform rash involving proximal thighs and lower abdomen   Lymph nodes: Cervical unremarkable   Musculoskeletal:    Lines/Devices:  Intact, no erythema, drainage or tenderness   Psych: Alert and oriented, normal mood affect given the setting       Data Review:   Recent Results (from the past 24 hour(s))   GLUCOSE, POC    Collection Time: 10/16/22  8:54 PM   Result Value Ref Range    Glucose (POC) 151 (H) 65 - 117 mg/dL    Performed by Autumn Alfaro (PCT)    CBC W/O DIFF    Collection Time: 10/17/22  4:05 AM   Result Value Ref Range    WBC 14.8 (H) 4.1 - 11.1 K/uL    RBC 4.44 4.10 - 5.70 M/uL    HGB 11.7 (L) 12.1 - 17.0 g/dL    HCT 36.0 (L) 36.6 - 50.3 %    MCV 81.1 80.0 - 99.0 FL    MCH 26.4 26.0 - 34.0 PG    MCHC 32.5 30.0 - 36.5 g/dL    RDW 14.0 11.5 - 14.5 %    PLATELET 320 052 - 528 K/uL    MPV 10.2 8.9 - 12.9 FL    NRBC 0.0 0  WBC    ABSOLUTE NRBC 0.00 0.00 - 1.77 K/uL   METABOLIC PANEL, COMPREHENSIVE    Collection Time: 10/17/22  4:05 AM   Result Value Ref Range    Sodium 136 136 - 145 mmol/L    Potassium 3.2 (L) 3.5 - 5.1 mmol/L    Chloride 102 97 - 108 mmol/L    CO2 26 21 - 32 mmol/L    Anion gap 8 5 - 15 mmol/L    Glucose 141 (H) 65 - 100 mg/dL    BUN 7 6 - 20 MG/DL    Creatinine 0.83 0.70 - 1.30 MG/DL    BUN/Creatinine ratio 8 (L) 12 - 20      eGFR >60 >60 ml/min/1.73m2    Calcium 8.6 8.5 - 10.1 MG/DL    Bilirubin, total 0.6 0.2 - 1.0 MG/DL    ALT (SGPT) 87 (H) 12 - 78 U/L    AST (SGOT) 33 15 - 37 U/L    Alk.  phosphatase 89 45 - 117 U/L    Protein, total 7.2 6.4 - 8.2 g/dL    Albumin 2.7 (L) 3.5 - 5.0 g/dL    Globulin 4.5 (H) 2.0 - 4.0 g/dL    A-G Ratio 0.6 (L) 1.1 - 2.2     MAGNESIUM    Collection Time: 10/17/22  4:05 AM   Result Value Ref Range    Magnesium 2.2 1.6 - 2.4 mg/dL   PHOSPHORUS    Collection Time: 10/17/22  4:05 AM   Result Value Ref Range    Phosphorus 2.2 (L) 2.6 - 4.7 MG/DL   NT-PRO BNP    Collection Time: 10/17/22  4:05 AM   Result Value Ref Range    NT pro-BNP 5,803 (H) <125 PG/ML   TSH 3RD GENERATION    Collection Time: 10/17/22  4:05 AM   Result Value Ref Range    TSH 1.48 0.36 - 3.74 uIU/mL   TROPONIN-HIGH SENSITIVITY    Collection Time: 10/17/22  4:05 AM   Result Value Ref Range    Troponin-High Sensitivity 197 (HH) 0 - 76 ng/L   GLUCOSE, POC    Collection Time: 10/17/22  7:40 AM   Result Value Ref Range    Glucose (POC) 124 (H) 65 - 117 mg/dL    Performed by [facility]    TROPONIN-HIGH SENSITIVITY    Collection Time: 10/17/22  7:53 AM   Result Value Ref Range    Troponin-High Sensitivity 166 (HH) 0 - 76 ng/L   GLUCOSE, POC    Collection Time: 10/17/22 11:23 AM   Result Value Ref Range    Glucose (POC) 131 (H) 65 - 117 mg/dL    Performed by Sherlon Kanaris PCT    GLUCOSE, POC    Collection Time: 10/17/22  3:44 PM   Result Value Ref Range    Glucose (POC) 151 (H) 65 - 117 mg/dL    Performed by Ivette Goldman         Microbiology:      Studies:      Signed By: Raquel Montilla DO     October 17, 2022

## 2022-10-17 NOTE — PROGRESS NOTES
RRT evaluated patient due to elevated MEWS and primary RN concerned about rash and flushed face after receiving first dose of vancomycin today. Patient awake, alert with unlabored respirations sitting in chair. Patient stood up to show rash to upper thighs which appear to be hive-like though patient denies pain or itching. Flushed face also noted though patient denies hot feeling or sweating. Primary RN, Vince Baker, contacting physician. No signs of distress noted. Will continue to follow if MEWS remain elevated and as needed. Primary RN states no current concerns for patient after speaking with physician.     Shirin Rocha RN, Jordy Powell

## 2022-10-17 NOTE — PROGRESS NOTES
Transition of Care Plan: RUR-6%  PT/OT following--no needs  Pt is under medical management  For today: diuresis, ID consult for IV abx and ECHO  Pt's spouse to transport him home at d/c  Pt to follow up OP  CM following for any d/c needs  FELICITAS Perry

## 2022-10-17 NOTE — PROGRESS NOTES
[facility]  [address] [place] [address]  [phone]      Hospitalist Progress Note      NAME: Wendi Clayton   :  1955  MRM:  [id number]    Date/Time of service: 10/17/2022  9:34 AM       Subjective:     Chief Complaint:  Patient was personally seen and examined by me during this time period. Chart reviewed. Developed pulm edema, hypoxia overnight. Also rash from abx        Objective:       Vitals:       Last 24hrs VS reviewed since prior progress note.  Most recent are:    Visit Vitals  /71 (BP 1 Location: Left upper arm, BP Patient Position: At rest)   Pulse 98   Temp 98.9 °F (37.2 °C)   Resp 25   Ht 5' 10\" (1.778 m)   Wt 95.5 kg (210 lb 8.6 oz)   SpO2 93%   BMI 30.21 kg/m²     SpO2 Readings from Last 6 Encounters:   10/17/22 93%    O2 Flow Rate (L/min): 2 l/min     Intake/Output Summary (Last 24 hours) at 10/17/2022 0934  Last data filed at 10/17/2022 2689  Gross per 24 hour   Intake 840 ml   Output 3300 ml   Net -2460 ml          Exam:     Physical Exam:    Gen:  Well-developed, well-nourished, morbidly obese, in no acute distress  HEENT:  Pink conjunctivae, PERRL, hearing intact to voice, moist mucous membranes  Neck:  Supple, without masses, thyroid non-tender  Resp:  No accessory muscle use, rales at bases  Card:  No murmurs, normal S1, S2 without thrills, bruits or peripheral edema  Abd:  Soft, non-tender, non-distended, normoactive bowel sounds are present  Musc:  No cyanosis or clubbing  Skin:  maculo-papular rash on thigh  Neuro:  Cranial nerves 3-12 are grossly intact, follows commands appropriately  Psych:  Good insight, oriented to person, place and time, alert    Medications Reviewed: (see below)    Lab Data Reviewed: (see below)    ______________________________________________________________________    Medications:     Current Facility-Administered Medications   Medication Dose Route Frequency    potassium chloride SR (KLOR-CON 10) tablet 40 mEq  40 mEq Oral BID    diphenhydrAMINE (BENADRYL) injection 25 mg  25 mg IntraVENous Q4H PRN    bumetanide (BUMEX) injection 0.5 mg  0.5 mg IntraVENous Q12H    aspirin delayed-release tablet 81 mg  81 mg Oral DAILY    latanoprost (XALATAN) 0.005 % ophthalmic solution 1 Drop  1 Drop Both Eyes QHS    timolol (TIMOPTIC) 0.5 % ophthalmic solution 1 Drop  1 Drop Both Eyes DAILY    sodium chloride (NS) flush 5-40 mL  5-40 mL IntraVENous Q8H    sodium chloride (NS) flush 5-40 mL  5-40 mL IntraVENous PRN    0.9% sodium chloride infusion 25 mL  25 mL IntraVENous PRN    acetaminophen (TYLENOL) tablet 650 mg  650 mg Oral Q6H PRN    Or    acetaminophen (TYLENOL) suppository 650 mg  650 mg Rectal Q6H PRN    senna-docusate (PERICOLACE) 8.6-50 mg per tablet 1 Tablet  1 Tablet Oral BID    bisacodyL (DULCOLAX) suppository 10 mg  10 mg Rectal DAILY PRN    promethazine (PHENERGAN) tablet 12.5 mg  12.5 mg Oral Q6H PRN    Or    ondansetron (ZOFRAN) injection 4 mg  4 mg IntraVENous Q6H PRN    enoxaparin (LOVENOX) injection 40 mg  40 mg SubCUTAneous DAILY    HYDROmorphone (DILAUDID) syringe 0.5 mg  0.5 mg IntraVENous Q4H PRN    insulin lispro (HUMALOG) injection   SubCUTAneous AC&HS    glucose chewable tablet 16 g  4 Tablet Oral PRN    glucagon (GLUCAGEN) injection 1 mg  1 mg IntraMUSCular PRN    [Held by provider] vancomycin (VANCOCIN) 1,000 mg in 0.9% sodium chloride 250 mL ([id number])  1,000 mg IntraVENous Q12H    [Held by provider] cefepime (MAXIPIME) 2 g in 0.9% sodium chloride (MBP/ADV) 100 mL MBP  2 g IntraVENous Q8H    metoprolol tartrate (LOPRESSOR) tablet 12.5 mg  12.5 mg Oral Q12H    sodium chloride (NS) flush 5-40 mL  5-40 mL IntraVENous Q8H    sodium chloride (NS) flush 5-40 mL  5-40 mL IntraVENous PRN    [Held by provider] metroNIDAZOLE (FLAGYL) IVPB premix 500 mg  500 mg IntraVENous Q12H          Lab Review:     Recent Labs     10/17/22  0405 10/16/22  0730 10/15/22  1548   WBC 14.8* 9.5 13.2*   HGB 11.7* 10.6* 12.5   HCT 36.0* 32.2* 37.9    245 297       Recent Labs     10/17/22  0405 10/16/22  0730 10/15/22  1548    136 130*   K 3.2* 3.3* 3.4*    106 95*   CO2 26 23 27   * 123* 189*   BUN 7 11 22*   CREA 0.83 0.94 1.51*   CA 8.6 8.2* 9.1   MG 2.2 2.2  --    PHOS 2.2* 1.9*  --    ALB 2.7* 2.6* 3.3*   TBILI 0.6 0.5 0.6   ALT 87* 98* 56   INR  --  1.0  --        Lab Results   Component Value Date/Time    Glucose (POC) 124 (H) 10/17/2022 07:40 AM    Glucose (POC) 151 (H) 10/16/2022 08:54 PM    Glucose (POC) 117 10/16/2022 04:16 PM    Glucose (POC) 118 (H) 10/16/2022 11:50 AM    Glucose (POC) 112 10/16/2022 07:52 AM          Assessment / Plan:     80 yo hx of HTN, DM, prostate CA, recent tooth abscess, presented w/ fevers, rigors, severe sepsis. Hospital course complicated by SVT, pulm edema, elevated trop, drug rash     1) Severe sepsis: sepsis POA, now resolved. Unclear source. Hx of recent tooth abscess. Face/maxillary CT neg for abscess. Blood Cx neg so far. Viral panel neg. Was on IV Vanc/cefepime/flagyl, but holding due to drug rash. Consult ID     2) Hypotension: due to sepsis, now resolved. Will monitor closely     3) SVT/pulm edema/CHF: likely iatrogenic from IVF. Will start IV bumex. Check echo. Consult Cards    4) Elevated Trop: likely demand ischemia. No ischemic changes on EKG. Will follow enzymes. Cont ASA. Defer to Cards    5) SVT: now improving. Due to pulm edema, sepsis. Started low dose metoprolol     6) Seizure-like activities: reported by ED, but symptoms were consistent with rigors, fevers, sepsis. No evidence of seizures. Head CT neg. Will monitor      7) ALYSHA: now resolved with IVF. Due to sepsis, dehydration. Monitor BMP     8) HTN: hold norvasc, HCTZ, lisinopril due to hypotension, ALYSHA. Started metoprolol, bumex      9) DM type 2: A1C 6.6%. Hold metformin, glipizide. Cont SSI    10) Drug rash: holding IV abx.   Start IV Benadryl prn    Total time spent with patient care: 36 Minutes **I personally saw and examined the patient during this time period**                 Care Plan discussed with: Patient, nursing    Discussed:  Care Plan    Prophylaxis:  Lovenox    Disposition:  Home w/Family           ___________________________________________________    Attending Physician: Robbin Bond MD

## 2022-10-18 ENCOUNTER — APPOINTMENT (OUTPATIENT)
Dept: NON INVASIVE DIAGNOSTICS | Age: 67
DRG: 864 | End: 2022-10-18
Attending: INTERNAL MEDICINE
Payer: MEDICARE

## 2022-10-18 LAB
ANION GAP SERPL CALC-SCNC: 8 MMOL/L (ref 5–15)
BNP SERPL-MCNC: 3290 PG/ML
BUN SERPL-MCNC: 10 MG/DL (ref 6–20)
BUN/CREAT SERPL: 12 (ref 12–20)
CALCIUM SERPL-MCNC: 8.4 MG/DL (ref 8.5–10.1)
CHLORIDE SERPL-SCNC: 101 MMOL/L (ref 97–108)
CO2 SERPL-SCNC: 27 MMOL/L (ref 21–32)
CREAT SERPL-MCNC: 0.86 MG/DL (ref 0.7–1.3)
ECHO AO ASC DIAM: 3.4 CM
ECHO AO ASCENDING AORTA INDEX: 1.6 CM/M2
ECHO AV AREA PEAK VELOCITY: 2.4 CM2
ECHO AV AREA PEAK VELOCITY: 2.5 CM2
ECHO AV AREA VTI: 2.4 CM2
ECHO AV AREA/BSA VTI: 1.1 CM2/M2
ECHO AV MEAN GRADIENT: 6 MMHG
ECHO AV MEAN VELOCITY: 1.1 M/S
ECHO AV PEAK GRADIENT: 10 MMHG
ECHO AV PEAK GRADIENT: 11 MMHG
ECHO AV PEAK VELOCITY: 1.6 M/S
ECHO AV PEAK VELOCITY: 1.6 M/S
ECHO AV VTI: 25 CM
ECHO LA DIAMETER INDEX: 1.18 CM/M2
ECHO LA DIAMETER: 2.5 CM
ECHO LA VOL 2C: 28 ML (ref 18–58)
ECHO LA VOL 4C: 19 ML (ref 18–58)
ECHO LA VOL BP: 23 ML (ref 18–58)
ECHO LA VOL/BSA BIPLANE: 11 ML/M2 (ref 16–34)
ECHO LA VOLUME AREA LENGTH: 25 ML
ECHO LA VOLUME INDEX A2C: 13 ML/M2 (ref 16–34)
ECHO LA VOLUME INDEX A4C: 9 ML/M2 (ref 16–34)
ECHO LA VOLUME INDEX AREA LENGTH: 12 ML/M2 (ref 16–34)
ECHO LV E' LATERAL VELOCITY: 8 CM/S
ECHO LV E' SEPTAL VELOCITY: 7 CM/S
ECHO LV FRACTIONAL SHORTENING: 16 % (ref 28–44)
ECHO LV INTERNAL DIMENSION DIASTOLE INDEX: 2.59 CM/M2
ECHO LV INTERNAL DIMENSION DIASTOLIC: 5.5 CM (ref 4.2–5.9)
ECHO LV INTERNAL DIMENSION SYSTOLIC INDEX: 2.17 CM/M2
ECHO LV INTERNAL DIMENSION SYSTOLIC: 4.6 CM
ECHO LV IVSD: 1.1 CM (ref 0.6–1)
ECHO LV MASS 2D: 227.4 G (ref 88–224)
ECHO LV MASS INDEX 2D: 107.3 G/M2 (ref 49–115)
ECHO LV POSTERIOR WALL DIASTOLIC: 1 CM (ref 0.6–1)
ECHO LV RELATIVE WALL THICKNESS RATIO: 0.36
ECHO LVOT AREA: 3.5 CM2
ECHO LVOT AV VTI INDEX: 0.73
ECHO LVOT DIAM: 2.1 CM
ECHO LVOT MEAN GRADIENT: 3 MMHG
ECHO LVOT PEAK GRADIENT: 6 MMHG
ECHO LVOT PEAK VELOCITY: 1.2 M/S
ECHO LVOT STROKE VOLUME INDEX: 29.9 ML/M2
ECHO LVOT SV: 63.4 ML
ECHO LVOT VTI: 18.3 CM
ECHO MV A VELOCITY: 1.01 M/S
ECHO MV E DECELERATION TIME (DT): 146.3 MS
ECHO MV E VELOCITY: 0.95 M/S
ECHO MV E/A RATIO: 0.94
ECHO MV E/E' LATERAL: 11.88
ECHO MV E/E' RATIO (AVERAGED): 12.72
ECHO MV E/E' SEPTAL: 13.57
ECHO PV MAX VELOCITY: 1.2 M/S
ECHO PV PEAK GRADIENT: 6 MMHG
ECHO RV FREE WALL PEAK S': 12 CM/S
ECHO RV INTERNAL DIMENSION: 4.4 CM
ECHO RV TAPSE: 1.8 CM (ref 1.7–?)
ECHO TV REGURGITANT MAX VELOCITY: 2.14 M/S
ECHO TV REGURGITANT PEAK GRADIENT: 19 MMHG
ERYTHROCYTE [DISTWIDTH] IN BLOOD BY AUTOMATED COUNT: 14.1 % (ref 11.5–14.5)
GLUCOSE BLD STRIP.AUTO-MCNC: 118 MG/DL (ref 65–117)
GLUCOSE BLD STRIP.AUTO-MCNC: 122 MG/DL (ref 65–117)
GLUCOSE BLD STRIP.AUTO-MCNC: 135 MG/DL (ref 65–117)
GLUCOSE BLD STRIP.AUTO-MCNC: 151 MG/DL (ref 65–117)
GLUCOSE SERPL-MCNC: 144 MG/DL (ref 65–100)
HCT VFR BLD AUTO: 37.5 % (ref 36.6–50.3)
HGB BLD-MCNC: 12.1 G/DL (ref 12.1–17)
MAGNESIUM SERPL-MCNC: 2.4 MG/DL (ref 1.6–2.4)
MCH RBC QN AUTO: 26 PG (ref 26–34)
MCHC RBC AUTO-ENTMCNC: 32.3 G/DL (ref 30–36.5)
MCV RBC AUTO: 80.5 FL (ref 80–99)
NRBC # BLD: 0 K/UL (ref 0–0.01)
NRBC BLD-RTO: 0 PER 100 WBC
PHOSPHATE SERPL-MCNC: 2.3 MG/DL (ref 2.6–4.7)
PLATELET # BLD AUTO: 398 K/UL (ref 150–400)
PMV BLD AUTO: 10.3 FL (ref 8.9–12.9)
POTASSIUM SERPL-SCNC: 3.8 MMOL/L (ref 3.5–5.1)
RBC # BLD AUTO: 4.66 M/UL (ref 4.1–5.7)
SERVICE CMNT-IMP: ABNORMAL
SODIUM SERPL-SCNC: 136 MMOL/L (ref 136–145)
TROPONIN-HIGH SENSITIVITY: 67 NG/L (ref 0–76)
TROPONIN-HIGH SENSITIVITY: 78 NG/L (ref 0–76)
WBC # BLD AUTO: 12.5 K/UL (ref 4.1–11.1)

## 2022-10-18 PROCEDURE — 74011000250 HC RX REV CODE- 250: Performed by: INTERNAL MEDICINE

## 2022-10-18 PROCEDURE — 74011250636 HC RX REV CODE- 250/636: Performed by: INTERNAL MEDICINE

## 2022-10-18 PROCEDURE — 94761 N-INVAS EAR/PLS OXIMETRY MLT: CPT

## 2022-10-18 PROCEDURE — 36415 COLL VENOUS BLD VENIPUNCTURE: CPT

## 2022-10-18 PROCEDURE — 80048 BASIC METABOLIC PNL TOTAL CA: CPT

## 2022-10-18 PROCEDURE — 74011250636 HC RX REV CODE- 250/636: Performed by: HOSPITALIST

## 2022-10-18 PROCEDURE — 74011000258 HC RX REV CODE- 258: Performed by: INTERNAL MEDICINE

## 2022-10-18 PROCEDURE — 99232 SBSQ HOSP IP/OBS MODERATE 35: CPT | Performed by: SPECIALIST

## 2022-10-18 PROCEDURE — 93306 TTE W/DOPPLER COMPLETE: CPT

## 2022-10-18 PROCEDURE — 82962 GLUCOSE BLOOD TEST: CPT

## 2022-10-18 PROCEDURE — APPSS30 APP SPLIT SHARED TIME 16-30 MINUTES: Performed by: NURSE PRACTITIONER

## 2022-10-18 PROCEDURE — 83880 ASSAY OF NATRIURETIC PEPTIDE: CPT

## 2022-10-18 PROCEDURE — 84100 ASSAY OF PHOSPHORUS: CPT

## 2022-10-18 PROCEDURE — 65270000046 HC RM TELEMETRY

## 2022-10-18 PROCEDURE — 83735 ASSAY OF MAGNESIUM: CPT

## 2022-10-18 PROCEDURE — 74011000250 HC RX REV CODE- 250: Performed by: HOSPITALIST

## 2022-10-18 PROCEDURE — 74011250637 HC RX REV CODE- 250/637: Performed by: INTERNAL MEDICINE

## 2022-10-18 PROCEDURE — 84484 ASSAY OF TROPONIN QUANT: CPT

## 2022-10-18 PROCEDURE — 99232 SBSQ HOSP IP/OBS MODERATE 35: CPT | Performed by: INTERNAL MEDICINE

## 2022-10-18 PROCEDURE — 85027 COMPLETE CBC AUTOMATED: CPT

## 2022-10-18 PROCEDURE — 93306 TTE W/DOPPLER COMPLETE: CPT | Performed by: STUDENT IN AN ORGANIZED HEALTH CARE EDUCATION/TRAINING PROGRAM

## 2022-10-18 RX ORDER — METOPROLOL TARTRATE 25 MG/1
12.5 TABLET, FILM COATED ORAL EVERY 12 HOURS
Status: DISCONTINUED | OUTPATIENT
Start: 2022-10-18 | End: 2022-10-19 | Stop reason: HOSPADM

## 2022-10-18 RX ORDER — GLIPIZIDE 5 MG/1
10 TABLET ORAL
Status: DISCONTINUED | OUTPATIENT
Start: 2022-10-19 | End: 2022-10-19 | Stop reason: HOSPADM

## 2022-10-18 RX ADMIN — DIPHENHYDRAMINE HYDROCHLORIDE 25 MG: 50 INJECTION, SOLUTION INTRAMUSCULAR; INTRAVENOUS at 18:02

## 2022-10-18 RX ADMIN — BUMETANIDE 0.5 MG: 0.25 INJECTION INTRAMUSCULAR; INTRAVENOUS at 08:58

## 2022-10-18 RX ADMIN — ENOXAPARIN SODIUM 40 MG: 100 INJECTION SUBCUTANEOUS at 08:59

## 2022-10-18 RX ADMIN — LATANOPROST 1 DROP: 50 SOLUTION/ DROPS OPHTHALMIC at 22:05

## 2022-10-18 RX ADMIN — SODIUM CHLORIDE: 900 INJECTION, SOLUTION INTRAVENOUS at 10:56

## 2022-10-18 RX ADMIN — METOPROLOL TARTRATE 12.5 MG: 25 TABLET, FILM COATED ORAL at 08:59

## 2022-10-18 RX ADMIN — SENNOSIDES AND DOCUSATE SODIUM 1 TABLET: 50; 8.6 TABLET ORAL at 22:04

## 2022-10-18 RX ADMIN — TIMOLOL MALEATE 1 DROP: 5 SOLUTION/ DROPS OPHTHALMIC at 09:02

## 2022-10-18 RX ADMIN — SODIUM CHLORIDE, PRESERVATIVE FREE 10 ML: 5 INJECTION INTRAVENOUS at 22:05

## 2022-10-18 RX ADMIN — DIPHENHYDRAMINE HYDROCHLORIDE 25 MG: 50 INJECTION, SOLUTION INTRAMUSCULAR; INTRAVENOUS at 05:29

## 2022-10-18 RX ADMIN — BUMETANIDE 0.5 MG: 0.25 INJECTION INTRAMUSCULAR; INTRAVENOUS at 22:04

## 2022-10-18 RX ADMIN — SODIUM CHLORIDE, PRESERVATIVE FREE 10 ML: 5 INJECTION INTRAVENOUS at 17:04

## 2022-10-18 RX ADMIN — ASPIRIN 81 MG: 81 TABLET, COATED ORAL at 08:59

## 2022-10-18 RX ADMIN — DIPHENHYDRAMINE HYDROCHLORIDE 25 MG: 50 INJECTION, SOLUTION INTRAMUSCULAR; INTRAVENOUS at 22:23

## 2022-10-18 RX ADMIN — DIPHENHYDRAMINE HYDROCHLORIDE 25 MG: 50 INJECTION, SOLUTION INTRAMUSCULAR; INTRAVENOUS at 11:27

## 2022-10-18 RX ADMIN — POTASSIUM CHLORIDE 40 MEQ: 750 TABLET, FILM COATED, EXTENDED RELEASE ORAL at 08:59

## 2022-10-18 RX ADMIN — POTASSIUM CHLORIDE 40 MEQ: 750 TABLET, FILM COATED, EXTENDED RELEASE ORAL at 18:02

## 2022-10-18 RX ADMIN — SODIUM CHLORIDE, PRESERVATIVE FREE 10 ML: 5 INJECTION INTRAVENOUS at 17:05

## 2022-10-18 RX ADMIN — METOPROLOL TARTRATE 12.5 MG: 25 TABLET, FILM COATED ORAL at 22:04

## 2022-10-18 RX ADMIN — MEROPENEM 1 G: 1 INJECTION, POWDER, FOR SOLUTION INTRAVENOUS at 01:33

## 2022-10-18 RX ADMIN — HYDROMORPHONE HYDROCHLORIDE 0.5 MG: 1 INJECTION, SOLUTION INTRAMUSCULAR; INTRAVENOUS; SUBCUTANEOUS at 01:37

## 2022-10-18 NOTE — PROGRESS NOTES
Was called regarding patient having some chest pain After CT scan. Also patient has some labored breathing was placed on 2 L nasal cannula due to oxygen dropped to around 90%. X-ray yesterday shows pulmonary edema and has been receiving Bumex. He has lung sounds throughout both lung fields with crackles. Give Bumex now. EKG was also obtained which showed no acute ischemic changes. Will trend troponins x3. Appears to be pleuritic chest pain as the pain is worse deep breathing.

## 2022-10-18 NOTE — PROGRESS NOTES
[facility] Progress Note           Talmadge Dubin DO    [phone] Office  [phone]  Fax    10/18/2022      Assessment & Plan:     Febrile illness with leukocytosis. No further fevers. WBC trending down. Status post recent tooth extraction. Maxillofacial CT unremarkable and no dental complaints. Extensive work-up including CT C-A-P negative for acute infectious process. CT head reports mild sinusitis however patient with no sinus complaints. Blood cultures NGSF. Micha Guthrie Urine culture sterile. At this point with no obvious infection despite extensive work-up and development of rash to multiple antibiotics I feel that the antibiotics are doing more harm than good at this point. Stop antibiotics and monitor   Rash. Suspect antibiotic related. Resolving. He was on vancomycin, cefepime, and metronidazole at the time the rash developed. Rash is likely related to cefepime. Rash resolved with discontinuation of antibiotics. Rash returned after patient given dose of meropenem. Now off all of antibiotics and rash beginning to resolve. Patient may benefit from outpatient follow-up with an allergist for antibiotic allergy testing to determine culprit antibiotic  History of penicillin allergy          Subjective:     Rash returned last night after meropenem was given    Objective:     Vitals: Visit Vitals  BP 97/69 (BP 1 Location: Left upper arm, BP Patient Position: At rest)   Pulse 91   Temp 98.5 °F (36.9 °C)   Resp 22   Ht 5' 10\" (1.778 m)   Wt 208 lb 5.4 oz (94.5 kg)   SpO2 100%   BMI 29.89 kg/m²        Tmax:  Temp (24hrs), Av.9 °F (37.2 °C), Min:98.3 °F (36.8 °C), Max:99.8 °F (37.7 °C)      Exam:   Patient is intubated: No    Physical Examination:   General:  Alert, cooperative, no distress   Head:  Normocephalic, atraumatic. Eyes:  Conjunctivae clear   Neck: Supple       Lungs:   No distress. Clear to auscultation bilaterally.    Chest wall:     Heart:  Regular rate and rhythm   Abdomen:   non-distended   Extremities: Moves all. Skin: Irregular erythematous mildly raised rash on trunk and abdomen. Fading   Neurologic: CNII-XII intact. Normal strength     Labs:        No lab exists for component: ITNL   No results for input(s): CPK, CKMB, TROIQ in the last 72 hours.   Recent Labs     10/18/22  0504 10/17/22  0405 10/16/22  0730 10/15/22  1548    136 136 130*   K 3.8 3.2* 3.3* 3.4*    102 106 95*   CO2 27 26 23 27   BUN 10 7 11 22*   CREA 0.86 0.83 0.94 1.51*   * 141* 123* 189*   PHOS 2.3* 2.2* 1.9*  --    MG 2.4 2.2 2.2  --    ALB  --  2.7* 2.6* 3.3*   WBC 12.5* 14.8* 9.5 13.2*   HGB 12.1 11.7* 10.6* 12.5   HCT 37.5 36.0* 32.2* 37.9    339 245 297     Recent Labs     10/16/22  0730   INR 1.0   PTP 10.2   APTT 31.0     Needs: urine analysis, urine sodium, protein and creatinine  No results found for: JIM, CREAU      Cultures:     No results found for: SDES  Lab Results   Component Value Date/Time    Culture result: No growth (<1,000 CFU/ML) 10/15/2022 04:16 PM    Culture result: NO GROWTH 3 DAYS 10/15/2022 04:02 PM    Culture result: NO GROWTH 3 DAYS 10/15/2022 03:47 PM       Radiology:     Medications       Current Facility-Administered Medications   Medication Dose Route Frequency Last Admin    metoprolol tartrate (LOPRESSOR) tablet 12.5 mg  12.5 mg Oral Q12H 12.5 mg at 10/18/22 0859    potassium phosphate 20 mmol in 0.9% sodium chloride 250 mL infusion   IntraVENous ONCE New Bag at 10/18/22 1056    potassium chloride SR (KLOR-CON 10) tablet 40 mEq  40 mEq Oral BID 40 mEq at 10/18/22 0859    diphenhydrAMINE (BENADRYL) injection 25 mg  25 mg IntraVENous Q4H PRN 25 mg at 10/18/22 1127    bumetanide (BUMEX) injection 0.5 mg  0.5 mg IntraVENous Q12H 0.5 mg at 10/18/22 0858    meropenem (MERREM) 1 g in 0.9% sodium chloride (MBP/ADV) 50 mL MBP  1 g IntraVENous Q8H Held at 10/18/22 0900    lidocaine 4 % patch 1 Patch  1 Patch TransDERmal Q24H 1 Patch at 10/17/22 1842    aspirin delayed-release tablet 81 mg  81 mg Oral DAILY 81 mg at 10/18/22 0859    latanoprost (XALATAN) 0.005 % ophthalmic solution 1 Drop  1 Drop Both Eyes QHS 1 Drop at 10/17/22 2100    timolol (TIMOPTIC) 0.5 % ophthalmic solution 1 Drop  1 Drop Both Eyes DAILY 1 Drop at 10/18/22 0902    sodium chloride (NS) flush 5-40 mL  5-40 mL IntraVENous Q8H 10 mL at 10/17/22 2100    sodium chloride (NS) flush 5-40 mL  5-40 mL IntraVENous PRN      0.9% sodium chloride infusion 25 mL  25 mL IntraVENous PRN      acetaminophen (TYLENOL) tablet 650 mg  650 mg Oral Q6H PRN      Or    acetaminophen (TYLENOL) suppository 650 mg  650 mg Rectal Q6H PRN      senna-docusate (PERICOLACE) 8.6-50 mg per tablet 1 Tablet  1 Tablet Oral BID      bisacodyL (DULCOLAX) suppository 10 mg  10 mg Rectal DAILY PRN      promethazine (PHENERGAN) tablet 12.5 mg  12.5 mg Oral Q6H PRN      Or    ondansetron (ZOFRAN) injection 4 mg  4 mg IntraVENous Q6H PRN      enoxaparin (LOVENOX) injection 40 mg  40 mg SubCUTAneous DAILY 40 mg at 10/18/22 0859    HYDROmorphone (DILAUDID) syringe 0.5 mg  0.5 mg IntraVENous Q4H PRN 0.5 mg at 10/18/22 0137    insulin lispro (HUMALOG) injection   SubCUTAneous AC&HS      glucose chewable tablet 16 g  4 Tablet Oral PRN      glucagon (GLUCAGEN) injection 1 mg  1 mg IntraMUSCular PRN      sodium chloride (NS) flush 5-40 mL  5-40 mL IntraVENous Q8H 10 mL at 10/17/22 1724    sodium chloride (NS) flush 5-40 mL  5-40 mL IntraVENous PRN             Case discussed with:      Jaren Chavez, DO

## 2022-10-18 NOTE — PROGRESS NOTES
CARDIOLOGY PROGRESS NOTE        [address] [facility], [place], [address] [facility]  Phone [phone]; Fax [phone]          10/18/2022 9:13 AM       Admit Date:           10/15/2022  Admit Diagnosis:  Severe sepsis ([facility] [address] [A41.9, R65.20]  :          1955   MRN:          [id number]        Assessment/Plan  Elevated troponin: resolved - likely due to type II MI, TTE pending     2. Elevated pBNP, mild pulm edema on xray: cont w/ conservative diuresis. Pt denies SOB currently. TTE pending - further workup pending results     3. Sepsis, recent tooth abscess: on abx, per primary team. UA likely contaminate      4. HTN: hypotensive on admit, BP improving      5. ST: likely r/t above, on metoprolol    6. Systemic rash: per primary team        NP spent 10 minutes in chart review of notes, VS, diagnostics. NP spent 10 minutes in examination of pt and review of plan of care  with pt/family. We discussed the expected course, resolution and complications of the diagnosis(es) in detail. Medication risks, benefits, costs, interactions, and alternatives were discussed as indicated. No intake/output data recorded. Last 3 Recorded Weights in this Encounter    10/15/22 1540 10/16/22 2258 10/18/22 0329   Weight: 93 kg (205 lb) 95.5 kg (210 lb 8.6 oz) 94.5 kg (208 lb 5.4 oz)         10/16 1901 - 10/18 0700  In: 1440 [P.O.:1440]  Out: 6677 [Urine:5950]    SUBJECTIVE       79 y.o.  male with PMH significant for HTN, DM, and prostate CA. Pt presented to the ED with complaints of fevers, chills, rigors and possible seizure like activity. Wife witnessed pt eyes rolled back in head and shaking for about 15 seconds. Associated w/ nausea and feeling lightheaded. He was recently diagnosed with a tooth abscess, which was removed, and was on antibiotics for 5 days after that. Sultana Barney reports  concerns over his rash, thinks it's worse .       Current Facility-Administered Medications   Medication Dose Route Frequency    metoprolol tartrate (LOPRESSOR) tablet 12.5 mg  12.5 mg Oral Q12H    potassium phosphate 20 mmol in 0.9% sodium chloride 250 mL infusion   IntraVENous ONCE    potassium chloride SR (KLOR-CON 10) tablet 40 mEq  40 mEq Oral BID    diphenhydrAMINE (BENADRYL) injection 25 mg  25 mg IntraVENous Q4H PRN    bumetanide (BUMEX) injection 0.5 mg  0.5 mg IntraVENous Q12H    meropenem (MERREM) 1 g in 0.9% sodium chloride (MBP/ADV) 50 mL MBP  1 g IntraVENous Q8H    lidocaine 4 % patch 1 Patch  1 Patch TransDERmal Q24H    aspirin delayed-release tablet 81 mg  81 mg Oral DAILY    latanoprost (XALATAN) 0.005 % ophthalmic solution 1 Drop  1 Drop Both Eyes QHS    timolol (TIMOPTIC) 0.5 % ophthalmic solution 1 Drop  1 Drop Both Eyes DAILY    sodium chloride (NS) flush 5-40 mL  5-40 mL IntraVENous Q8H    sodium chloride (NS) flush 5-40 mL  5-40 mL IntraVENous PRN    0.9% sodium chloride infusion 25 mL  25 mL IntraVENous PRN    acetaminophen (TYLENOL) tablet 650 mg  650 mg Oral Q6H PRN    Or    acetaminophen (TYLENOL) suppository 650 mg  650 mg Rectal Q6H PRN    senna-docusate (PERICOLACE) 8.6-50 mg per tablet 1 Tablet  1 Tablet Oral BID    bisacodyL (DULCOLAX) suppository 10 mg  10 mg Rectal DAILY PRN    promethazine (PHENERGAN) tablet 12.5 mg  12.5 mg Oral Q6H PRN    Or    ondansetron (ZOFRAN) injection 4 mg  4 mg IntraVENous Q6H PRN    enoxaparin (LOVENOX) injection 40 mg  40 mg SubCUTAneous DAILY    HYDROmorphone (DILAUDID) syringe 0.5 mg  0.5 mg IntraVENous Q4H PRN    insulin lispro (HUMALOG) injection   SubCUTAneous AC&HS    glucose chewable tablet 16 g  4 Tablet Oral PRN    glucagon (GLUCAGEN) injection 1 mg  1 mg IntraMUSCular PRN    sodium chloride (NS) flush 5-40 mL  5-40 mL IntraVENous Q8H    sodium chloride (NS) flush 5-40 mL  5-40 mL IntraVENous PRN      OBJECTIVE               Intake/Output Summary (Last 24 hours) at 10/18/2022 0913  Last data filed at 10/18/2022 0507  Gross per 24 hour   Intake 600 ml   Output 2975 ml   Net -2375 ml       Review of Systems - History obtained from the patient AS PER  HPI        PHYSICAL EXAM        Visit Vitals  /70   Pulse (!) 111   Temp 98.6 °F (37 °C)   Resp 17   Ht 5' 10\" (1.778 m)   Wt 94.5 kg (208 lb 5.4 oz)   SpO2 100%   BMI 29.89 kg/m²       Gen: Well-developed, well-nourished, in no acute distress  alert and oriented x 3  HEENT:  Pink conjunctivae, Hearing grossly normal.No scleral icterus or conjunctival, moist mucous membranes  Neck: Supple,No JVD, No Carotid Bruit, Thyroid- non tender No cervical lymphadenopathy  Resp: No accessory muscle use, Clear breath sounds, No rales or rhonchi  Card: Regular Rate,Rythm,Normal S1, S2, No murmurs, rubs or gallop. No thrills. MSK: No cyanosis or clubbing, good capillary refill  Skin: +systemic rash to legs, trunk, back  Neuro:  Moving all four extremities, no focal deficit, follows commands appropriately  Psych:  Good insight, oriented to person, place and time, alert, Nml Affect  LE: No edema       DATA REVIEW      No specialty comments available. Cardiac monitor: SR        Laboratory and Imaging have been reviewed by me and are notable for  No results for input(s): CPK, CKMB, TROIQ in the last 72 hours.   Recent Labs     10/18/22  0504 10/17/22  0405 10/16/22  0730    136 136   K 3.8 3.2* 3.3*   CO2 27 26 23   BUN 10 7 11   CREA 0.86 0.83 0.94   * 141* 123*   PHOS 2.3* 2.2* 1.9*   MG 2.4 2.2 2.2   WBC 12.5* 14.8* 9.5   HGB 12.1 11.7* 10.6*   HCT 37.5 36.0* 32.2*    339 245

## 2022-10-18 NOTE — PROGRESS NOTES
Bedside and Verbal shift change report given to Danie Vanegas RN (oncoming nurse) by Aubrey Guillaume RN (offgoing nurse). Report included the following information SBAR, Kardex, Intake/Output, MAR, Accordion, and Recent Results. 46 - Spoke with Dr. Nhan Caraballo on the phone to update on rash with previous antibiotics 10/16, antibiotics switched to meropenem. Pt received IV contrast with CT 10/17 at 1900, and last dose IV meropenem 10/18 at 0130, then rash developed at 0530. Night shift MD held abx and recs for dayshift team to assess. Per Dr. Nhan Caraballo, give benadryl, administer meropenem, and monitor for reaction. 1136 - Pt still with rash. Perfectserved Dr. Nhan Caraballo requesting MD see patient at bedside prior to giving antibiotic. Awaiting response. 1204 - Pt refusing lispro at this time, inquiring about home glipizide being ordered. Perfectserved Dr. Nhan Caraballo inquiring about ordering home med, awaiting response. [phone] - Dr. Luma Liang at bedside, MD updated on rash. Per MD continue to monitor patient without antibiotics. Discontinue meropenem. This patient was assisted with Intentional Toileting every 2 hours during this shift as appropriate. Documentation of ambulation and output reflected on Flowsheet as appropriate. Purposeful hourly rounding was completed using AIDET and 5Ps. Outcomes of PHR documented as they occurred. Bed alarm in use as appropriate. Dual Suction and ambubag in place. Bedside and Verbal shift change report given to STACY OLMOS (oncoming nurse) by Danie Vanegas RN (offgoing nurse). Report included the following information SBAR, Kardex, Intake/Output, MAR, Accordion, and Recent Results.

## 2022-10-18 NOTE — PROGRESS NOTES
[facility]  [address] [facility], [facility]  [phone]         Hospitalist Progress Note        NAME:  Jose Manuel Pineda   :  1955   MRN:  [id number]    Date/Time:  10/18/2022     Patient PCP:  Candiss Kocher, MD    Emergency Contact:    Extended Emergency Contact Information  Primary Emergency Contact: Kacie Monsalve  Address: [address] [place] [address] Phone: [phone]  Mobile Phone: [phone]  Relation: Spouse      Code: Full Code     Isolation Precautions: There are currently no Active Isolations        Subjective:     REASON FOR VISIT:  Recheck CHF, sepsis     HPI & INTERVAL HISTORY:     80 yo hx of HTN, DM, prostate CA, recent tooth abscess, presented w/ fevers, rigors, severe sepsis. Hospital course complicated by SVT, pulm edema, elevated trop, drug rash     10/18: Patient was seen and examined. He has no complaints. Phosphorus was low so it was replaced. Requested to restart his glipizide. Leukocytosis improving.       ALLERGIES  Allergies   Allergen Reactions    Penicillins Hives         ROS:  Gen:   Negative  Resp:  negative  CVS:   negative           Objective:      Visit Vitals  /65 (BP 1 Location: Left upper arm, BP Patient Position: At rest)   Pulse 92   Temp 99.1 °F (37.3 °C)   Resp 25   Ht 5' 10\" (1.778 m)   Wt 94.5 kg (208 lb 5.4 oz)   SpO2 96%   BMI 29.89 kg/m²       Physical Exam:  General: alert, well appearing, and no distress  Head: Normocephalic, without obvious abnormality, atraumatic  Eyes: PERRL, EOMI, anicteric sclerae, and conjuntiva clear  ENT: lips, mucosa, and tongue normal  Neck: normal, supple, and no tenderness  Lungs: clear to auscultation with good breath sounds and normal respiratory effort  Heart: S1, S2 normal, regular rate, and regular rhythm  Abd: not distended, soft, nontender, BS present and normactive  Ext: no cyanosis and bilateral lower extremity 3+ edema  Skin: normal skin color, no rashes, and texture normal  Neuro:  alert, oriented x 3, no defects noted in general exam.  Psych: not anxious, cooperative, appropriate affect      Medications:  Current Facility-Administered Medications   Medication Dose Route Frequency    metoprolol tartrate (LOPRESSOR) tablet 12.5 mg  12.5 mg Oral Q12H    potassium chloride SR (KLOR-CON 10) tablet 40 mEq  40 mEq Oral BID    diphenhydrAMINE (BENADRYL) injection 25 mg  25 mg IntraVENous Q4H PRN    bumetanide (BUMEX) injection 0.5 mg  0.5 mg IntraVENous Q12H    meropenem (MERREM) 1 g in 0.9% sodium chloride (MBP/ADV) 50 mL MBP  1 g IntraVENous Q8H    lidocaine 4 % patch 1 Patch  1 Patch TransDERmal Q24H    aspirin delayed-release tablet 81 mg  81 mg Oral DAILY    latanoprost (XALATAN) 0.005 % ophthalmic solution 1 Drop  1 Drop Both Eyes QHS    timolol (TIMOPTIC) 0.5 % ophthalmic solution 1 Drop  1 Drop Both Eyes DAILY    sodium chloride (NS) flush 5-40 mL  5-40 mL IntraVENous Q8H    sodium chloride (NS) flush 5-40 mL  5-40 mL IntraVENous PRN    0.9% sodium chloride infusion 25 mL  25 mL IntraVENous PRN    acetaminophen (TYLENOL) tablet 650 mg  650 mg Oral Q6H PRN    Or    acetaminophen (TYLENOL) suppository 650 mg  650 mg Rectal Q6H PRN    senna-docusate (PERICOLACE) 8.6-50 mg per tablet 1 Tablet  1 Tablet Oral BID    bisacodyL (DULCOLAX) suppository 10 mg  10 mg Rectal DAILY PRN    promethazine (PHENERGAN) tablet 12.5 mg  12.5 mg Oral Q6H PRN    Or    ondansetron (ZOFRAN) injection 4 mg  4 mg IntraVENous Q6H PRN    enoxaparin (LOVENOX) injection 40 mg  40 mg SubCUTAneous DAILY    HYDROmorphone (DILAUDID) syringe 0.5 mg  0.5 mg IntraVENous Q4H PRN    insulin lispro (HUMALOG) injection   SubCUTAneous AC&HS    glucose chewable tablet 16 g  4 Tablet Oral PRN    glucagon (GLUCAGEN) injection 1 mg  1 mg IntraMUSCular PRN    sodium chloride (NS) flush 5-40 mL  5-40 mL IntraVENous Q8H    sodium chloride (NS) flush 5-40 mL 5-40 mL IntraVENous PRN        Labs:  Recent Labs     10/18/22  0504   WBC 12.5*   HGB 12.1   HCT 37.5        Recent Labs     10/18/22  0504 10/17/22  0405    136   K 3.8 3.2*    102   CO2 27 26   * 141*   BUN 10 7   CREA 0.86 0.83   CA 8.4* 8.6   MG 2.4 2.2   PHOS 2.3* 2.2*   ALB  --  2.7*   TBILI  --  0.6   ALT  --  87*       Radiology:  CT CHEST ABD PELV W CONT    Result Date: 10/17/2022  1. Small bilateral pleural effusions and bibasilar atelectasis. 2.  No evidence of infection in the chest, abdomen, or pelvis. 3.  Reactive mediastinal lymphadenopathy. XR CHEST PORT    Result Date: 10/17/2022  CHF pattern, slightly worsened. .    I personally reviewed and interpreted the imaging studies and agree with official reading    The labs, imaging studies, and medications was reviewed by me on: October 18, 2022         Assessment/Plan:      78 yo hx of HTN, DM, prostate CA, recent tooth abscess, presented w/ fevers, rigors, severe sepsis. Hospital course complicated by SVT, pulm edema, elevated trop, drug rash     1) Severe sepsis: sepsis POA, now resolved. Unclear source. Hx of recent tooth abscess. Face/maxillary CT neg for abscess. Blood Cx neg so far. Viral panel neg. Was on IV GORDON Vanc/cefepime/flagyl, but holding due to drug rash. Consult ID and note reviewed     2) Hypotension: due to sepsis, now resolved. Will monitor closely      3) SVT/pulm edema/CHF: likely iatrogenic from IVF. Feeling better. Continue IV bumex. Check echo. Elevated BNP Consult Cards     4) Elevated Trop: likely demand ischemia. No ischemic changes on EKG. Will follow enzymes. Cont ASA. Defer to Cards     5) SVT: now improving. Due to pulm edema, sepsis. Started low dose metoprolol     6) Seizure-like activities: reported by ED, but symptoms were consistent with rigors, fevers, sepsis. No evidence of seizures. Head CT neg. Will monitor      7) ALYSHA: now resolved with IVF.   Due to sepsis, dehydration. Monitor BMP     8) HTN: hold norvasc, HCTZ, lisinopril due to hypotension, ALYSHA. Started metoprolol, bumex      9) DM type 2: A1C 6.6%. Hold metformin, wants to restart glipizide. Cont SSI     10) Drug rash: holding IV abx.   Start IV Benadryl prn    11) Hypophosphatemia: Replacing IV and recheck in AM*    12) Bibasilar atelecateasis: IS ordered    Body mass index is 29.89 kg/m².:  25.0 - 29.9:  Overweight      Risk of deterioration: high      Discussed:  Pt's condition, Imaging findings, Lab findings, Assessment, and Care Plan discussed with: Patient, RN, and Care Manager    Prophylaxis:  Lovenox SQ    Probable disposition:  Home with family      Total time: -28- minutes **I personally saw and examined the patient during this time period**      Date of service:    10/18/2022                ___________________________________________________    Admitting Physician: Nuha Batista MD

## 2022-10-18 NOTE — PROGRESS NOTES
10/18/22  4:05 PM    Care Management Daily Progress Note:    1. Severe sepsis (HCC)    2. Elevated troponin    3. Fever, unspecified fever cause    4. Leukocytosis, unspecified type    5. Rash and nonspecific skin eruption      RUR: 6%  Level: Low  LOS: 3D    Transition of Care:  1). Awaiting medical stability and dc order  2). ID following- discontinued abx  3). PT/OT evaluated- no skilled needs noted  4).  CM following for dc needs    Maple Blizzard, MSW  Care Manager

## 2022-10-18 NOTE — PROGRESS NOTES
RRT RN Note    Rounded on pt for MEWS of 4. RN indicated that vitals have improved since 2000 and will take updated full set for updated MEWS. No further interventions needed at this time.

## 2022-10-19 VITALS
DIASTOLIC BLOOD PRESSURE: 78 MMHG | OXYGEN SATURATION: 91 % | TEMPERATURE: 98.1 F | BODY MASS INDEX: 28.88 KG/M2 | HEART RATE: 107 BPM | SYSTOLIC BLOOD PRESSURE: 126 MMHG | HEIGHT: 70 IN | RESPIRATION RATE: 25 BRPM | WEIGHT: 201.72 LBS

## 2022-10-19 LAB
ANION GAP SERPL CALC-SCNC: 6 MMOL/L (ref 5–15)
ATRIAL RATE: 108 BPM
ATRIAL RATE: 116 BPM
BASOPHILS # BLD: 0 K/UL (ref 0–0.1)
BASOPHILS NFR BLD: 0 % (ref 0–1)
BNP SERPL-MCNC: 865 PG/ML
BUN SERPL-MCNC: 13 MG/DL (ref 6–20)
BUN/CREAT SERPL: 14 (ref 12–20)
CALCIUM SERPL-MCNC: 8.7 MG/DL (ref 8.5–10.1)
CALCULATED P AXIS, ECG09: 33 DEGREES
CALCULATED R AXIS, ECG10: -144 DEGREES
CALCULATED R AXIS, ECG10: -33 DEGREES
CALCULATED T AXIS, ECG11: 119 DEGREES
CALCULATED T AXIS, ECG11: 34 DEGREES
CHLORIDE SERPL-SCNC: 105 MMOL/L (ref 97–108)
CO2 SERPL-SCNC: 28 MMOL/L (ref 21–32)
CREAT SERPL-MCNC: 0.91 MG/DL (ref 0.7–1.3)
DIAGNOSIS, 93000: NORMAL
DIAGNOSIS, 93000: NORMAL
DIFFERENTIAL METHOD BLD: ABNORMAL
EOSINOPHIL # BLD: 0.6 K/UL (ref 0–0.4)
EOSINOPHIL NFR BLD: 7 % (ref 0–7)
ERYTHROCYTE [DISTWIDTH] IN BLOOD BY AUTOMATED COUNT: 14.2 % (ref 11.5–14.5)
GLUCOSE BLD STRIP.AUTO-MCNC: 120 MG/DL (ref 65–117)
GLUCOSE SERPL-MCNC: 124 MG/DL (ref 65–100)
HCT VFR BLD AUTO: 31.3 % (ref 36.6–50.3)
HGB BLD-MCNC: 10 G/DL (ref 12.1–17)
IMM GRANULOCYTES # BLD AUTO: 0 K/UL
IMM GRANULOCYTES NFR BLD AUTO: 0 %
LYMPHOCYTES # BLD: 1.4 K/UL (ref 0.8–3.5)
LYMPHOCYTES NFR BLD: 17 % (ref 12–49)
MCH RBC QN AUTO: 26.1 PG (ref 26–34)
MCHC RBC AUTO-ENTMCNC: 31.9 G/DL (ref 30–36.5)
MCV RBC AUTO: 81.7 FL (ref 80–99)
MONOCYTES # BLD: 0.5 K/UL (ref 0–1)
MONOCYTES NFR BLD: 6 % (ref 5–13)
MYELOCYTES NFR BLD MANUAL: 1 %
NEUTS SEG # BLD: 5.8 K/UL (ref 1.8–8)
NEUTS SEG NFR BLD: 69 % (ref 32–75)
NRBC # BLD: 0 K/UL (ref 0–0.01)
NRBC BLD-RTO: 0 PER 100 WBC
P-R INTERVAL, ECG05: 132 MS
P-R INTERVAL, ECG05: 144 MS
PHOSPHATE SERPL-MCNC: 2.6 MG/DL (ref 2.6–4.7)
PLATELET # BLD AUTO: 353 K/UL (ref 150–400)
PMV BLD AUTO: 9.8 FL (ref 8.9–12.9)
POTASSIUM SERPL-SCNC: 4 MMOL/L (ref 3.5–5.1)
Q-T INTERVAL, ECG07: 342 MS
Q-T INTERVAL, ECG07: 374 MS
QRS DURATION, ECG06: 120 MS
QRS DURATION, ECG06: 126 MS
QTC CALCULATION (BEZET), ECG08: 475 MS
QTC CALCULATION (BEZET), ECG08: 501 MS
RBC # BLD AUTO: 3.83 M/UL (ref 4.1–5.7)
RBC MORPH BLD: ABNORMAL
SERVICE CMNT-IMP: ABNORMAL
SODIUM SERPL-SCNC: 139 MMOL/L (ref 136–145)
VENTRICULAR RATE, ECG03: 108 BPM
VENTRICULAR RATE, ECG03: 116 BPM
WBC # BLD AUTO: 8.4 K/UL (ref 4.1–11.1)

## 2022-10-19 PROCEDURE — 94761 N-INVAS EAR/PLS OXIMETRY MLT: CPT

## 2022-10-19 PROCEDURE — 82962 GLUCOSE BLOOD TEST: CPT

## 2022-10-19 PROCEDURE — 36415 COLL VENOUS BLD VENIPUNCTURE: CPT

## 2022-10-19 PROCEDURE — 74011250637 HC RX REV CODE- 250/637: Performed by: INTERNAL MEDICINE

## 2022-10-19 PROCEDURE — 83880 ASSAY OF NATRIURETIC PEPTIDE: CPT

## 2022-10-19 PROCEDURE — 85025 COMPLETE CBC W/AUTO DIFF WBC: CPT

## 2022-10-19 PROCEDURE — 74011250637 HC RX REV CODE- 250/637: Performed by: HOSPITALIST

## 2022-10-19 PROCEDURE — 80048 BASIC METABOLIC PNL TOTAL CA: CPT

## 2022-10-19 PROCEDURE — 74011250636 HC RX REV CODE- 250/636: Performed by: INTERNAL MEDICINE

## 2022-10-19 PROCEDURE — APPSS30 APP SPLIT SHARED TIME 16-30 MINUTES: Performed by: NURSE PRACTITIONER

## 2022-10-19 PROCEDURE — 99231 SBSQ HOSP IP/OBS SF/LOW 25: CPT | Performed by: INTERNAL MEDICINE

## 2022-10-19 PROCEDURE — 99232 SBSQ HOSP IP/OBS MODERATE 35: CPT | Performed by: SPECIALIST

## 2022-10-19 PROCEDURE — 84100 ASSAY OF PHOSPHORUS: CPT

## 2022-10-19 RX ORDER — METOPROLOL TARTRATE 25 MG/1
12.5 TABLET, FILM COATED ORAL EVERY 12 HOURS
Qty: 30 TABLET | Refills: 0 | Status: SHIPPED | OUTPATIENT
Start: 2022-10-19 | End: 2022-11-18

## 2022-10-19 RX ADMIN — METOPROLOL TARTRATE 12.5 MG: 25 TABLET, FILM COATED ORAL at 10:00

## 2022-10-19 RX ADMIN — DIPHENHYDRAMINE HYDROCHLORIDE 25 MG: 50 INJECTION, SOLUTION INTRAMUSCULAR; INTRAVENOUS at 03:12

## 2022-10-19 RX ADMIN — TIMOLOL MALEATE 1 DROP: 5 SOLUTION/ DROPS OPHTHALMIC at 10:02

## 2022-10-19 RX ADMIN — GLIPIZIDE 10 MG: 5 TABLET ORAL at 10:00

## 2022-10-19 RX ADMIN — ASPIRIN 81 MG: 81 TABLET, COATED ORAL at 10:00

## 2022-10-19 NOTE — PROGRESS NOTES
10/19/22  12:03 PM    CM noted dc order. No CM needs noted. Medicare pt has received, reviewed, and signed 2nd IM letter informing them of their right to appeal the discharge. Signed copy has been placed on pt bedside chart.     [facility]

## 2022-10-19 NOTE — PROGRESS NOTES
[facility] Progress Note           Margie Avery DO    [phone] Office  [phone]  Fax    10/19/2022      Assessment & Plan:     Febrile illness with leukocytosis. Resolved. Work-up unrevealing. Stable off antibiotics. Plans noted for discharge today. No antibiotics planned at discharge   Rash. Suspect antibiotic related. Resolving. He was on vancomycin, cefepime, and metronidazole at the time the rash developed. Rash is likely related to cefepime. Rash resolved with discontinuation of antibiotics. Rash returned after patient given dose of meropenem. Now off all of antibiotics and rash has resolved. Patient may benefit from outpatient follow-up with an allergist for antibiotic allergy testing to determine culprit antibiotic. He was given a list of antibiotics that he received at this hospitalization  History of penicillin allergy          Subjective:     Rash resolving    Objective:     Vitals: Visit Vitals  /78   Pulse (!) 107   Temp 98.1 °F (36.7 °C)   Resp 25   Ht 5' 10\" (1.778 m)   Wt 201 lb 11.5 oz (91.5 kg)   SpO2 91%   BMI 28.94 kg/m²          Tmax:  Temp (24hrs), Av.7 °F (37.1 °C), Min:98.1 °F (36.7 °C), Max:99.3 °F (37.4 °C)      Exam:   Patient is intubated: No    Physical Examination:   General:  Alert, cooperative, no distress   Head:  Normocephalic, atraumatic. Eyes:  Conjunctivae clear   Neck: Supple       Lungs:   No distress. Chest wall:     Heart:     Abdomen:   non-distended   Extremities: Moves all. Skin: Irregular erythematous mildly raised rash on trunk and abdomen. Significantly improved   Neurologic: CNII-XII intact. Normal strength     Labs:        No lab exists for component: ITNL   No results for input(s): CPK, CKMB, TROIQ in the last 72 hours.   Recent Labs     10/19/22  0319 10/18/22  0504 10/17/22  0405    136 136   K 4.0 3.8 3.2*    101 102   CO2 28 27 26   BUN 13 10 7   CREA 0.91 0.86 0.83   * 144* 141*   PHOS 2.6 2.3* 2.2*   MG  --  2.4 2.2   ALB  --   --  2.7*   WBC 8.4 12.5* 14.8*   HGB 10.0* 12.1 11.7*   HCT 31.3* 37.5 36.0*    398 339       No results for input(s): INR, PTP, APTT, INREXT, INREXT in the last 72 hours.     Needs: urine analysis, urine sodium, protein and creatinine  No results found for: JIM, CREAU      Cultures:     No results found for: SDES  Lab Results   Component Value Date/Time    Culture result: No growth (<1,000 CFU/ML) 10/15/2022 04:16 PM    Culture result: NO GROWTH 4 DAYS 10/15/2022 04:02 PM    Culture result: NO GROWTH 4 DAYS 10/15/2022 03:47 PM       Radiology:     Medications       Current Facility-Administered Medications   Medication Dose Route Frequency Last Admin    metoprolol tartrate (LOPRESSOR) tablet 12.5 mg  12.5 mg Oral Q12H 12.5 mg at 10/19/22 1000    glipiZIDE (GLUCOTROL) tablet 10 mg  10 mg Oral ACB 10 mg at 10/19/22 1000    diphenhydrAMINE (BENADRYL) injection 25 mg  25 mg IntraVENous Q4H PRN 25 mg at 10/19/22 0312    lidocaine 4 % patch 1 Patch  1 Patch TransDERmal Q24H 1 Patch at 10/17/22 1842    aspirin delayed-release tablet 81 mg  81 mg Oral DAILY 81 mg at 10/19/22 1000    latanoprost (XALATAN) 0.005 % ophthalmic solution 1 Drop  1 Drop Both Eyes QHS 1 Drop at 10/18/22 2205    timolol (TIMOPTIC) 0.5 % ophthalmic solution 1 Drop  1 Drop Both Eyes DAILY 1 Drop at 10/19/22 1002    sodium chloride (NS) flush 5-40 mL  5-40 mL IntraVENous Q8H 10 mL at 10/18/22 2205    sodium chloride (NS) flush 5-40 mL  5-40 mL IntraVENous PRN      0.9% sodium chloride infusion 25 mL  25 mL IntraVENous PRN      acetaminophen (TYLENOL) tablet 650 mg  650 mg Oral Q6H PRN      Or    acetaminophen (TYLENOL) suppository 650 mg  650 mg Rectal Q6H PRN      senna-docusate (PERICOLACE) 8.6-50 mg per tablet 1 Tablet  1 Tablet Oral BID 1 Tablet at 10/18/22 2204    bisacodyL (DULCOLAX) suppository 10 mg  10 mg Rectal DAILY PRN      promethazine (PHENERGAN) tablet 12.5 mg  12.5 mg Oral Q6H PRN      Or    ondansetron (ZOFRAN) injection 4 mg  4 mg IntraVENous Q6H PRN      enoxaparin (LOVENOX) injection 40 mg  40 mg SubCUTAneous DAILY 40 mg at 10/18/22 0859    HYDROmorphone (DILAUDID) syringe 0.5 mg  0.5 mg IntraVENous Q4H PRN 0.5 mg at 10/18/22 0137    insulin lispro (HUMALOG) injection   SubCUTAneous AC&HS      glucose chewable tablet 16 g  4 Tablet Oral PRN      glucagon (GLUCAGEN) injection 1 mg  1 mg IntraMUSCular PRN      sodium chloride (NS) flush 5-40 mL  5-40 mL IntraVENous Q8H 10 mL at 10/18/22 2205    sodium chloride (NS) flush 5-40 mL  5-40 mL IntraVENous PRN             Case discussed with:      Robb Smith, DO

## 2022-10-19 NOTE — DISCHARGE SUMMARY
Hospitalist Discharge Summary     Patient ID:  Sultana Barney  [id number]  38 y.o.  1955    Admit date: 10/15/2022    Discharge date and time: 10/19/2022    Admission Diagnoses: Severe sepsis ([facility] [A41.9, R65.20]    Discharge Diagnoses:    SIRS       Hospital Course: The patient is a 80 yo hx of HTN, DM, prostate CA, recent tooth abscess, presented w/ fevers, rigors, severe sepsis. The patient was diagnosed with a left lower jaw tooth abscess 10 days ago. He was prescribed oral clindamycin for 5 days. Soon after the abx course, the patient c/o fevers, chills, rigors. His PCP prescribed 10 more days of oral Doxy, but symptoms persisted. Denied stiff neck, cough, chest pain, SOB, nausea, vomiting, diarrhea, or tick bites. In the ED, WBC was 13.2. U/A contaminated with epithelials. CXR neg pneumonia. He was admitted for further evaluation and treatment of the followin) Febrile Illness / SIRS. No source ever identified so unclear if this was true sepsis. Hx of recent tooth abscess. Face/maxillary CT neg for abscess. Blood Cx neg so far. Viral panel neg. Was on IV GORDON Vanc/cefepime/flagyl, but held due to drug rash. Has done well without so discharges home without further abx.      2) Hypotension: due to hypovolemia vs covert infx, now resolved. 3) pulm edema/CHF: likely iatrogenic from IVF. Diuresed well and will resume home HCTZ; no new diuretic. 4) Elevated Trop: likely demand ischemia. No ischemic changes on EKG. Will follow enzymes. Cont ASA. Defer to Cards     5) SVT: now improving. Due to pulm edema, ?infx. Started low dose metoprolol at dc (stop home amlod, lisin)     6) ALYSHA: now resolved with IVF. 7) HTN: hold norvasc, lisinopril due to hypotension. Can resume HCTZ and was started on metop. If BP rising can resume prior antihypertensives. 9) DM type 2: A1C 6.6%. 10) Drug rash: holding IV abx.   Start IV Benadryl prn           PCP: Lexi Winston, MD     Consults: Cardiology and ID    Condition of patient at discharge: good and improved    Discharge Exam:    Physical Exam:    Gen: Well-developed, well-nourished, in no acute distress  HEENT:  Pink conjunctivae, PERRL, hearing intact to voice, moist mucous membranes  Neck: Supple, without masses, thyroid non-tender  Resp: No accessory muscle use, clear breath sounds without wheezes rales or rhonchi  Card: No murmurs, normal S1, S2 without thrills, bruits or peripheral edema  Abd:  Soft, non-tender, non-distended, normoactive bowel sounds are present, no palpable organomegaly and no detectable hernias  Lymph:  No cervical or inguinal adenopathy  Musc: No cyanosis or clubbing  Skin: No rashes or ulcers, skin turgor is good  Neuro:  Cranial nerves are grossly intact, no focal motor weakness, follows commands appropriately  Psych:  Good insight, oriented to person, place and time, alert          Disposition: home    Patient Instructions:   Current Discharge Medication List        START taking these medications    Details   metoprolol tartrate (LOPRESSOR) 25 mg tablet Take 0.5 Tablets by mouth every twelve (12) hours for 30 days. Qty: 30 Tablet, Refills: 0           CONTINUE these medications which have NOT CHANGED    Details   hydroCHLOROthiazide (HYDRODIURIL) 25 mg tablet Take 25 mg by mouth daily. glipiZIDE (GLUCOTROL) 10 mg tablet Take 10 mg by mouth Daily (before breakfast). metFORMIN (GLUCOPHAGE) 500 mg tablet Take 500 mg by mouth two (2) times daily (with meals). aspirin delayed-release 81 mg tablet Take 81 mg by mouth daily. latanoprost (XALATAN) 0.005 % ophthalmic solution Administer 1 Drop to both eyes nightly. timoloL maleate 0.5 % drpd ophthalmic solution Administer 1 Drop to both eyes daily. sildenafil citrate (VIAGRA) 100 mg tablet Take 25 mg by mouth daily as needed for Erectile Dysfunction.            STOP taking these medications       amLODIPine (NORVASC) 10 mg tablet Comments:   Reason for Stopping:         lisinopriL (PRINIVIL, ZESTRIL) 5 mg tablet Comments:   Reason for Stopping:             Activity: Activity as tolerated  Diet: Regular Diet  Wound Care: None needed    Follow-up with Candy Varghese MD in 1 week. Follow-up tests/labs BP check    Approximate time spent in patient care on day of discharge: 40 min    Signed:   Jayda Saxena MD  10/19/2022  9:55 AM

## 2022-10-19 NOTE — DISCHARGE SUMMARY
Hospitalist Discharge Summary   *ATTENTION:  This note has been created by a medical student for educational purposes only. Please do not refer to the content of this note for clinical decision-making, billing, or other purposes. Please see attending physicians note to obtain clinical information on this patient. *    Patient ID:  Kellen Raygoza  [id number]  79 y.o.  1955    Admit date: 10/15/2022    Discharge date and time: 10/19/2022    Admission Diagnoses: Severe sepsis ([facility] [A41.9, R65.20]    Discharge Diagnoses:    Principal Problem:    Severe sepsis ([facility] (10/15/2022)    Active Problems:    Hypotension (10/15/2022)      ALYSHA (acute kidney injury) ([facility] (10/15/2022)      DM type 2 (diabetes mellitus, type 2) ([facility] (10/15/2022)           Hospital Course:   Severe sepsis: p/w fevers, rigors, hypotension, leukocytosis, and tachycardia. Hx of tooth abscess. CT of maxillofacial, C/A/P all negative for abscess or infection source. Received fluid resuscitation and IV abx cefepime, vanco, and flagyl. The abx were stopped due to the below. The pt remains afebrile, normotensive, and leukocytosis has resolved. No need to restart abx at this time. Monitor symptoms. Drug rash: The patient then developed a drug rash. Antibiotics were stopped, and meropenem was started. The rash did not resolve. Meropenem was then also stopped. The drug rash has resolved with cessation of abx. Recommend follow up with an allergist for abx testing. Pulmonary edema: CXR showed CHF pattern, pulmonary edema. The pt was diuresed with IV bumex and will resume home thiazide. SVT: likely from fluid overload. Metoprolol 12.5 BID was started. No recurrent episodes noted.  Would continue metoprolol    DM: continue home glipizide & resume metformin on 10/20 - contrast received on 10/17    HTN: continue metoprolol 12.5mg BID    PCP: Morgan Hernandez MD     Consults: ID and Hospitalist    Condition of patient at discharge: good, improved, and stable    Discharge Exam: NAD. States his rash is completely gone and he feels great. \"When can I get out of here? \"    Physical Exam:    Gen: Well-developed, well-nourished, in no acute distress  HEENT:  Pink conjunctivae, PERRL, hearing intact to voice, moist mucous membranes  Neck: Supple, without masses, thyroid non-tender  Resp: No accessory muscle use, clear breath sounds without wheezes rales or rhonchi  Card: No murmurs, normal S1, S2 without thrills, bruits or peripheral edema  Abd:  Soft, non-tender, non-distended, normoactive bowel sounds are present, no palpable organomegaly and no detectable hernias  Lymph:  No cervical or inguinal adenopathy  Musc: No cyanosis or clubbing  Skin: No rashes or ulcers, skin turgor is good  Neuro:  Cranial nerves are grossly intact, no focal motor weakness, follows commands appropriately  Psych:  Good insight, oriented to person, place and time, alert          Disposition: home    Patient Instructions:   Cannot display discharge medications since this patient is not currently admitted. Activity: Activity as tolerated  Diet: Resume previous diet  Wound Care: None needed    Follow-up with Andreea Lee MD in 1 week.   Follow-up tests/labs Allergist    Approximate time spent in patient care on day of discharge: 30    Signed:  Luis Wynn [facility] - [facility]YLAN Student  10/19/2022  12:24 PM

## 2022-10-19 NOTE — PROGRESS NOTES
0730 Bedside and Verbal shift change report given to SONIDO KUMAR  (oncoming nurse) by Kenisha Oh RN  (offgoing nurse). Report included the following information SBAR, Kardex, Intake/Output, MAR, Accordion, Recent Results, Med Rec Status, and Cardiac Rhythm NSR . This patient was assisted with Intentional Toileting every 2 hours during this shift as appropriate. Documentation of ambulation and output reflected on Flowsheet as appropriate. Purposeful hourly rounding was completed using AIDET and 5Ps. Outcomes of PHR documented as they occurred. Bed alarm in use as appropriate. Dual Suction and ambubag in place. 1145 Patient refusing blood sugar check as he is being discharged    1145 I have reviewed discharge instructions with the patient and spouse. The patient and spouse verbalized understanding.

## 2022-10-19 NOTE — DISCHARGE INSTRUCTIONS
HOSPITALIST DISCHARGE INSTRUCTIONS  NAME: Kellen Raygoza   :  1955   MRN:  [id number]     Date/Time:  10/19/2022 9:52 AM    ADMIT DATE: 10/15/2022     DISCHARGE DATE: 10/19/2022     ADMITTING DIAGNOSIS:  Fever  Supraventricular Tachycardia  Volume overload  Hypotension    DISCHARGE DIAGNOSIS:  You were admitted for evaluation and treatment of the above. You received IV antibiotics for several days with improvement and do not need to continue any more antibiotics. You were noted to have volume overload and an abnormal heart rhythm. You responded well to a medicine called bumex, but do not need to take this at home. I have made some medication changes due to your low blood pressure. Stop amlodipine and lisinopril for now. Start metoprolol, which will help with blood pressure and the abnormal heart rhythm you experienced here. MEDICATIONS:    It is important that you take the medication exactly as they are prescribed. Keep your medication in the bottles provided by the pharmacist and keep a list of the medication names, dosages, and times to be taken in your wallet. Do not take other medications without consulting your doctor. If you experience any of the following symptoms then please call your primary care physician or return to the emergency room if you cannot get hold of your doctor:  Fever, chills, nausea, vomiting, diarrhea, change in mentation, falling, bleeding, shortness of breath    Follow Up:  Please call and set up an appointment to see them in 1 week. Morgan Hernandez [address]  [phone]              Information obtained by :  I understand that if any problems occur once I am at home I am to contact my physician. I understand and acknowledge receipt of the instructions indicated above. Physician's or R.N.'s Signature                                                                  Date/Time                                                                                                                                              Patient or Representative Signature                                                          Date/Time

## 2022-10-19 NOTE — PROGRESS NOTES
CARDIOLOGY PROGRESS NOTE        [address], [place], [address] [facility]  Phone [phone]; Fax [phone]          10/19/2022 9:13 AM       Admit Date:           10/15/2022  Admit Diagnosis:  Severe sepsis ([facility] [address] [A41.9, R65.20]  :          1955   MRN:          [id number]        Assessment/Plan  Elevated troponin: resolved - likely due to type II MI, TTE w/ EF 50-55%. Pt may be discharged from cardiac standpoint, will arrange OP follow up     2. Elevated pBNP, mild pulm edema on xray: cont w/ conservative diuresis. Pt denies SOB currently. TTE w/ EF 50-55%, pBNP improved. D/c bumex d/t lower BP - can resume HCTZ on discharge      3. Sepsis, recent tooth abscess: on abx, per primary team. UA likely contaminate      4. HTN: hypotensive on admit, BP improving but remains low     5. ST: likely r/t above, on metoprolol    6. Systemic rash: per primary team        NP spent 10 minutes in chart review of notes, VS, diagnostics. NP spent 10 minutes in examination of pt and review of plan of care  with pt/family. We discussed the expected course, resolution and complications of the diagnosis(es) in detail. Medication risks, benefits, costs, interactions, and alternatives were discussed as indicated. 10/19 0701 - 10/19 1900  In: 240 [P.O.:240]  Out: 750 [Urine:750]    Last 3 Recorded Weights in this Encounter    10/18/22 0329 10/18/22 0920 10/19/22 0303   Weight: 94.5 kg (208 lb 5.4 oz) 94.5 kg (208 lb 5.4 oz) 91.5 kg (201 lb 11.5 oz)         10/17 1901 - 10/19 0700  In: 960 [P.O.:960]  Out: Meka Rivera [[id number]]    SUBJECTIVE       79 y.o.  male with PMH significant for HTN, DM, and prostate CA. Pt presented to the ED with complaints of fevers, chills, rigors and possible seizure like activity. Wife witnessed pt eyes rolled back in head and shaking for about 15 seconds. Associated w/ nausea and feeling lightheaded.  He was recently diagnosed with a tooth abscess, which was removed, and was on antibiotics for 5 days after that. Angel Meyer reports rash has improved, feeling much better.        Current Facility-Administered Medications   Medication Dose Route Frequency    metoprolol tartrate (LOPRESSOR) tablet 12.5 mg  12.5 mg Oral Q12H    glipiZIDE (GLUCOTROL) tablet 10 mg  10 mg Oral ACB    diphenhydrAMINE (BENADRYL) injection 25 mg  25 mg IntraVENous Q4H PRN    lidocaine 4 % patch 1 Patch  1 Patch TransDERmal Q24H    aspirin delayed-release tablet 81 mg  81 mg Oral DAILY    latanoprost (XALATAN) 0.005 % ophthalmic solution 1 Drop  1 Drop Both Eyes QHS    timolol (TIMOPTIC) 0.5 % ophthalmic solution 1 Drop  1 Drop Both Eyes DAILY    sodium chloride (NS) flush 5-40 mL  5-40 mL IntraVENous Q8H    sodium chloride (NS) flush 5-40 mL  5-40 mL IntraVENous PRN    0.9% sodium chloride infusion 25 mL  25 mL IntraVENous PRN    acetaminophen (TYLENOL) tablet 650 mg  650 mg Oral Q6H PRN    Or    acetaminophen (TYLENOL) suppository 650 mg  650 mg Rectal Q6H PRN    senna-docusate (PERICOLACE) 8.6-50 mg per tablet 1 Tablet  1 Tablet Oral BID    bisacodyL (DULCOLAX) suppository 10 mg  10 mg Rectal DAILY PRN    promethazine (PHENERGAN) tablet 12.5 mg  12.5 mg Oral Q6H PRN    Or    ondansetron (ZOFRAN) injection 4 mg  4 mg IntraVENous Q6H PRN    enoxaparin (LOVENOX) injection 40 mg  40 mg SubCUTAneous DAILY    HYDROmorphone (DILAUDID) syringe 0.5 mg  0.5 mg IntraVENous Q4H PRN    insulin lispro (HUMALOG) injection   SubCUTAneous AC&HS    glucose chewable tablet 16 g  4 Tablet Oral PRN    glucagon (GLUCAGEN) injection 1 mg  1 mg IntraMUSCular PRN    sodium chloride (NS) flush 5-40 mL  5-40 mL IntraVENous Q8H    sodium chloride (NS) flush 5-40 mL  5-40 mL IntraVENous PRN      OBJECTIVE               Intake/Output Summary (Last 24 hours) at 10/19/2022 0833  Last data filed at 10/19/2022 0827  Gross per 24 hour   Intake 840 ml   Output 4500 ml   Net -3660 ml       Review of Systems - History obtained from the patient AS PER  HPI        PHYSICAL EXAM        Visit Vitals  BP 96/62 (BP 1 Location: Left upper arm, BP Patient Position: At rest)   Pulse 91   Temp 98.1 °F (36.7 °C)   Resp 25   Ht 5' 10\" (1.778 m)   Wt 91.5 kg (201 lb 11.5 oz)   SpO2 91%   BMI 28.94 kg/m²       Gen: Well-developed, well-nourished, in no acute distress  alert and oriented x 3  HEENT:  Pink conjunctivae, Hearing grossly normal.No scleral icterus or conjunctival, moist mucous membranes  Neck: Supple,No JVD, No Carotid Bruit, Thyroid- non tender No cervical lymphadenopathy  Resp: No accessory muscle use, Clear breath sounds, No rales or rhonchi  Card: Regular Rate,Rythm,Normal S1, S2, No murmurs, rubs or gallop. No thrills. MSK: No cyanosis or clubbing, good capillary refill  Skin: +rash, improving   Neuro:  Moving all four extremities, no focal deficit, follows commands appropriately  Psych:  Good insight, oriented to person, place and time, alert, Nml Affect  LE: No edema       DATA REVIEW      No specialty comments available. Cardiac monitor: SR    ECHO: 10/15/22    ECHO ADULT COMPLETE 10/18/2022 10/18/2022    Interpretation Summary    Left Ventricle: Low normal left ventricular systolic function with a visually estimated EF of 50 - 55%. Left ventricle size is normal. Mildly increased wall thickness. Mild global hypokinesis present. Normal diastolic function. Aortic Valve: Tricuspid valve. Mild sclerosis of the aortic valve cusp. Mitral Valve: Mildly thickened leaflet. Mild annular calcification of the mitral valve. Signed by: Madelaine Banks DO on 10/18/2022 10:53 AM      Laboratory and Imaging have been reviewed by me and are notable for  No results for input(s): CPK, CKMB, TROIQ in the last 72 hours.   Recent Labs     10/19/22  0319 10/18/22  0504 10/17/22  0405    136 136   K 4.0 3.8 3.2*   CO2 28 27 26   BUN 13 10 7   CREA 0.91 0.86 0.83   * 144* 141*   PHOS 2.6 2.3* 2.2*   MG  -- 2.4 2.2   WBC 8.4 12.5* 14.8*   HGB 10.0* 12.1 11.7*   HCT 31.3* 37.5 36.0*    398 339

## 2022-10-20 ENCOUNTER — PATIENT OUTREACH (OUTPATIENT)
Dept: CASE MANAGEMENT | Age: 67
End: 2022-10-20

## 2022-10-20 RX ORDER — DIPHENHYDRAMINE HCL 25 MG
25 CAPSULE ORAL
COMMUNITY

## 2022-10-20 NOTE — PROGRESS NOTES
Care Transitions Initial Call    Call within 2 business days of discharge: Yes     Patient: Manuel Lawton Patient : 1955 MRN: [id number]    Last Discharge [address]       Date Complaint Diagnosis Description Type Department Provider    10/15/22 Seizure; Fever Severe sepsis ([facility] [address] . .. ED to Hosp-Admission (Discharged) (ADMIT) Jonh Hendricks MD; Geraldine Frank. .. Was this an external facility discharge? No     Challenges to be reviewed by the provider   Additional needs identified to be addressed with provider: yes    Patient started on 12.5 mg Metoprolol BID at discharge    Patient developed rash while IP--ID suspects IV abx, patient abx d/c'd and patient reports today that rash is almost gone. He is taking benadryl. Patient was given information by Dr Fareed Rooney for allergist to be tested for abx allergies as OP. Patient has follow up with Dr Gio Grant , cards on  at [address] [place] [address] believes elevated troponins due to demand ischemia in setting of sepsis    EF 50--55%    Patient denies any fevers, pain , CP, SOB today. He reports no problems concerning is tooth abscess. Patient declines further calls from CTN for care management. Method of communication with provider : chart routing    Discussed COVID-19 related testing which was available at this time. Test results were negative. Patient informed of results, if available? yes     Advance Care Planning:   Does patient have an Advance Directive: decision makers updated, not on file. Inpatient Readmission Risk score: Unplanned Readmit Risk Score: 7.3    Was this a readmission?  no       Patients top risk factors for readmission: medical condition-none identified at this time    Interventions to address risk factors: Scheduled appointment with PCP-see goal , Scheduled appointment with Specialist-see goal, Education of patient/family/caregiver/guardian to support self-management-see goal, and Assistance in accessing community resources-[facility] health    Care Transition Nurse (CTN) contacted the patient by telephone to perform post hospital discharge assessment. Verified name and  with patient as identifiers. Provided introduction to self, and explanation of the CTN role. CTN reviewed discharge instructions, medical action plan and red flags with patient who verbalized understanding. Were discharge instructions available to patient? yes. Reviewed appropriate site of care based on symptoms and resources available to patient including: PCP and Specialist. Patient given an opportunity to ask questions and does not have any further questions or concerns at this time. The patient agrees to contact the PCP office for questions related to their healthcare. Medication reconciliation was performed with patient, who verbalizes understanding of administration of home medications. Advised obtaining a 90-day supply of all daily and as-needed medications. Referral to Pharm D needed: no     Home Health/Outpatient orders at discharge: none    Discussed follow-up appointments. If no appointment was previously scheduled, appointment scheduling offered: yes. Is follow up appointment scheduled within 7 days of discharge? yes. [facility] follow up appointment(s):   Future Appointments   Date Time Provider Ynes Matos   2022  1:00 PM Beryl Perez MD [facility]     [facility] follow up appointment(s): PCP 10/26/22    Patient declines any further calls from CTN for care management services. CTN provided contact information for future needs. Goals Addressed                   This Visit's Progress     Prevent complications post hospitalization. 10/20/22    Patient will attend PCP appt on 10/26/22 at 11:15  Patient will follow up with Dr Caren Sellers as new patient on 22 at 1:00pm--pt will call MD if he has any problems with leg edema prior to appt.  Patient reports he no longer has BLE edema that is documented in chart  Patient reports he was given information concerning allergy specialist by Dr Soledad Rojas states he will discuss with PCP and make follow up appt  Patient will monitor for rash worsening--at time of call patient reports he is taking benadryl and there are only a couple of rash bump on abdomen at this time. He has no other symptoms of anaphylaxis. Patient instructed to call PCP if he develops reappearance of rash, swelling, itchy throat, facial edema. Patient will monitor VS and keep log to take to appts--pt reports BP last night 139/88, HR 94, this morning 153/81, HR 86. He is taking metoprolol as directed. Patient given [facility] information  Patient politely declines further calls from CTN. He states that he and PCP will be able to manage his healthcare needs. He did take CTN contact information if he has questions or changes his mind. CTN will end KAREN at request of patient. ---ijeoma

## 2022-10-21 LAB
BACTERIA SPEC CULT: NORMAL
BACTERIA SPEC CULT: NORMAL
SERVICE CMNT-IMP: NORMAL
SERVICE CMNT-IMP: NORMAL

## 2022-10-25 NOTE — PROGRESS NOTES
Physician Progress Note      PATIENT:               Angella Cohen  CSN #:                  [id number]  :                       1955  ADMIT DATE:       10/15/2022 3:44 PM  [address] DATE:        10/19/2022 12:22 PM  RESPONDING  PROVIDER #:        Michelle Turner MD          QUERY TEXT:    Patient admitted with sepsis d/t suspected tooth abscess. Noted documentation in DCS of \"Febrile Illness / SIRS. No source ever identified so unclear if this was true sepsis. \" If possible, please document in progress notes and discharge summary the source of sepsis, or please document if the diagnosis of sepsis has been ruled out after further study. The medical record reflects the following:  Risk Factors: DM, recent tooth abscess  Clinical Indicators: H&P- Severe sepsis: unclear source. Suspect tooth abscess. - Temp 102.5 on admission with HR >90  - LA 2.66  - WBC 13.2 on admission  - DCS Febrile Illness / SIRS. No source ever identified so unclear if this was true sepsis  Treatment: NS bolus (4L), maxipime 2 g IV, vanc IV, flagyl 500 mg IV, monitoring    Thank you,    Augustine Ruffin RN  CDI  Options provided:  -- Sepsis, present on admission, due to, Please document source. -- Sepsis was ruled out after study  -- Other - I will add my own diagnosis  -- Disagree - Not applicable / Not valid  -- Disagree - Clinically unable to determine / Unknown  -- Refer to Clinical Documentation Reviewer    PROVIDER RESPONSE TEXT:    Sepsis was ruled out after study.     Query created by: Elaina Mcfarland on 10/24/2022 4:35 PM      Electronically signed by:  Michelle Turner MD 10/24/2022 8:03 PM

## 2022-11-30 ENCOUNTER — OFFICE VISIT (OUTPATIENT)
Dept: CARDIOLOGY CLINIC | Age: 67
End: 2022-11-30
Payer: MEDICARE

## 2022-11-30 VITALS
OXYGEN SATURATION: 97 % | WEIGHT: 200 LBS | SYSTOLIC BLOOD PRESSURE: 138 MMHG | HEIGHT: 70 IN | HEART RATE: 108 BPM | BODY MASS INDEX: 28.63 KG/M2 | DIASTOLIC BLOOD PRESSURE: 88 MMHG

## 2022-11-30 DIAGNOSIS — I10 HTN (HYPERTENSION), BENIGN: Primary | ICD-10-CM

## 2022-11-30 DIAGNOSIS — R77.8 TROPONIN LEVEL ELEVATED: ICD-10-CM

## 2022-11-30 PROCEDURE — 93005 ELECTROCARDIOGRAM TRACING: CPT | Performed by: SPECIALIST

## 2022-11-30 PROCEDURE — G0463 HOSPITAL OUTPT CLINIC VISIT: HCPCS | Performed by: SPECIALIST

## 2022-11-30 RX ORDER — LISINOPRIL 5 MG/1
TABLET ORAL DAILY
COMMUNITY

## 2022-11-30 RX ORDER — AMLODIPINE BESYLATE AND ATORVASTATIN CALCIUM 10; 10 MG/1; MG/1
1 TABLET, FILM COATED ORAL DAILY
COMMUNITY
End: 2022-11-30

## 2022-11-30 RX ORDER — AMLODIPINE BESYLATE 10 MG/1
10 TABLET ORAL DAILY
Qty: 30 TABLET | Refills: 5
Start: 2022-11-30

## 2022-11-30 NOTE — PATIENT INSTRUCTIONS

## 2022-11-30 NOTE — PROGRESS NOTES
Samir Vasquez is a 79 y.o. male    Visit Vitals  /88 (BP 1 Location: Left upper arm, BP Patient Position: Sitting, BP Cuff Size: Adult)   Pulse (!) 108   Ht 5' 10\" (1.778 m)   Wt 200 lb (90.7 kg)   SpO2 97%   BMI 28.70 kg/m²       Chief Complaint   Patient presents with    Hypertension    Cholesterol Problem       Chest pain NO  SOB NO  Dizziness NO  Swelling NO  Recent hospital visit [address]  HAD COVID?  NO

## 2022-11-30 NOTE — PROGRESS NOTES
CARDIOLOGY OFFICE NOTE    Luis Stevenson MD, [address], [place], [address] [facility]  Phone [phone]; Fax [phone]  Mobile [phone]   Voice Mail [phone]    Primary care: Darryn Carcamo MD       ATTENTION:   This medical record was transcribed using an electronic medical records/speech recognition system. Although proofread, it may and can contain electronic, spelling and other errors. Corrections may be executed at a later time. Please feel free to contact us for any clarifications as needed. Lisa Andrews is a 79 y.o. male with  referred for elevated troponin and proBNP during hospitalization for sepsis          Cardiac risk factors: dyslipidemia, diabetes mellitus, obesity, male gender, hypertension  I have personally obtained the history from the patient. HISTORY OF PRESENTING ILLNESS    Ms./Mr. Lisa Andrews  79 y.o. is being seen in the office after  during hospitalization for severe sepsis during which time his troponin and his proBNP were elevated. .  His past medical history significant for hypertension and T2DM as well as prostate cancer. He was discharged from the hospital October 19 after a case of severe sepsis. He is doing well and making a slow recovery but is very motivated. He has not had any chest pain but does have some shortness of breath with activity. He still little slow and comes with his wife today who also notices that. I think he still in recovery phase.   I did review with him his troponins and the significance of them being elevated as well as the proBNP     ACTIVE PROBLEM LIST     Patient Active Problem List    Diagnosis Date Noted    Severe sepsis ([address] 10/15/2022    Hypotension 10/15/2022    ALYSHA (acute kidney injury) ([address] 10/15/2022    DM type 2 (diabetes mellitus, type 2) ([address] 10/15/2022           PAST MEDICAL HISTORY     Past Medical History:   Diagnosis Date    DM type 2 (diabetes mellitus, type 2) ([facility] [address]     Hypertension     Prostate cancer ([facility] [address]            PAST SURGICAL HISTORY     Past Surgical History:   Procedure Laterality Date    HX APPENDECTOMY      HX PROSTATECTOMY            ALLERGIES     Allergies   Allergen Reactions    Penicillins Hives          FAMILY HISTORY     Family History   Problem Relation Age of Onset    Hypertension Father     negative for cardiac disease       SOCIAL HISTORY     Social History     Socioeconomic History    Marital status:    Tobacco Use    Smoking status: Former     Types: Cigarettes    Smokeless tobacco: Never    Tobacco comments:     Quit decades ago   Substance and Sexual Activity    Alcohol use: Yes    Drug use: Never         MEDICATIONS     Current Outpatient Medications   Medication Sig    amLODIPine-atorvastatin (CADUET) 10-10 mg per tablet Take 1 Tablet by mouth daily. lisinopriL (PRINIVIL, ZESTRIL) 5 mg tablet Take  by mouth daily. vit C/E/Zn/coppr/lutein/zeaxan (PRESERVISION AREDS-2 PO) Take  by mouth. hydroCHLOROthiazide (HYDRODIURIL) 25 mg tablet Take 25 mg by mouth daily. glipiZIDE (GLUCOTROL) 10 mg tablet Take 10 mg by mouth Daily (before breakfast). metFORMIN (GLUCOPHAGE) 500 mg tablet Take 500 mg by mouth two (2) times daily (with meals). aspirin delayed-release 81 mg tablet Take 81 mg by mouth daily. sildenafil citrate (VIAGRA) 100 mg tablet Take 25 mg by mouth daily as needed for Erectile Dysfunction. latanoprost (XALATAN) 0.005 % ophthalmic solution Administer 1 Drop to both eyes nightly. timoloL maleate 0.5 % drpd ophthalmic solution Administer 1 Drop to both eyes daily. diphenhydrAMINE (BENADRYL) 25 mg capsule Take 25 mg by mouth every six (6) hours as needed. (Patient not taking: Reported on 11/30/2022)     No current facility-administered medications for this visit. I have reviewed the nurses notes, vitals, problem list, allergy list, medical history, family, social history and medications. REVIEW OF SYMPTOMS    As per HPI  General: Pt denies excessive weight gain or loss. Pt is able to conduct ADL's  HEENT: Denies blurred vision, headaches, hearing loss, epistaxis and difficulty swallowing. Respiratory: Denies cough, congestion, shortness of breath, ELLIOTT, wheezing or stridor. Cardiovascular: Denies precordial pain, palpitations, edema or PND  Gastrointestinal: Denies poor appetite, indigestion, abdominal pain or blood in stool  Genitourinary: Denies hematuria, dysuria, increased urinary frequency  Musculoskeletal: Denies joint pain or swelling from muscles or joints  Neurologic: Denies tremor, paresthesias, headache, or sensory motor disturbance  Psychiatric: Denies confusion, insomnia, depression  Integumentray: Denies rash, itching or ulcers. Hematologic: Denies easy bruising, bleeding     PHYSICAL EXAMINATION      Vitals:    11/30/22 1256   BP: 138/88   Pulse: (!) 108   SpO2: 97%   Weight: 200 lb (90.7 kg)   Height: 5' 10\" (1.778 m)     General: Well developed, in no acute distress. HEENT: No jaundice, oral mucosa moist, no oral ulcers  Neck: Supple, no stiffness, no lymphadenopathy, supple  Heart:  Normal S1/S2 negative S3 or S4. Regular, no murmur, gallop or rub, no jugular venous distention  Respiratory: Clear bilaterally x 4, no wheezing or rales  Extremities:  No edema, normal cap refill, no cyanosis. Musculoskeletal: No clubbing, no deformities  Neuro: A&Ox3, speech clear, gait stable, cooperative, no focal neurologic deficits  Skin: Skin color is normal. No rashes or lesions. Non diaphoretic, moist.      EKG: Date: (11/30/2022) sinus rhythm left anterior fascicular block     DIAGNOSTIC DATA     1. Echo  10/18/22-EF 50 - 55%, Mild global hypokinesis,Tricuspid AV- Mild sclerosis of AV cusp.  Mild annular calcification of MV         LABORATORY DATA       Lab Results   Component Value Date/Time    WBC 8.4 10/19/2022 03:19 AM    HGB 10.0 (L) 10/19/2022 03:19 AM    HCT 31.3 (L) 10/19/2022 03:19 AM    PLATELET 463 20/86/3689 03:19 AM    MCV 81.7 10/19/2022 03:19 AM      Lab Results   Component Value Date/Time    Sodium 139 10/19/2022 03:19 AM    Potassium 4.0 10/19/2022 03:19 AM    Chloride 105 10/19/2022 03:19 AM    CO2 28 10/19/2022 03:19 AM    Anion gap 6 10/19/2022 03:19 AM    Glucose 124 (H) 10/19/2022 03:19 AM    BUN 13 10/19/2022 03:19 AM    Creatinine 0.91 10/19/2022 03:19 AM    BUN/Creatinine ratio 14 10/19/2022 03:19 AM    Calcium 8.7 10/19/2022 03:19 AM    Bilirubin, total 0.6 10/17/2022 04:05 AM    Alk. phosphatase 89 10/17/2022 04:05 AM    Protein, total 7.2 10/17/2022 04:05 AM    Albumin 2.7 (L) 10/17/2022 04:05 AM    Globulin 4.5 (H) 10/17/2022 04:05 AM    A-G Ratio 0.6 (L) 10/17/2022 04:05 AM    ALT (SGPT) 87 (H) 10/17/2022 04:05 AM      ECG (11/30/2022)-sinus rhythm left anterior hemiblock      ICD-10-CM ICD-9-CM   1. HTN (hypertension), benign  I10 401.1   2. Troponin level elevated  R77.8 790.6        ASSESSMENT/RECOMMENDATIONS:.      1.  Elevated troponin/proBNP  -Was felt to be related to supply and demand mismatch and not so much an MI but more of a type II MI.  -I believe it is still prudent that he undergo stress testing with his history of diabetes hypertension  -I have ordered exercise Cardiolite and this will be done in the January  -Is also being done because of some shortness of breath and this may be residual from his being deconditioned while hospitalized but may also be from underlying heart disease and an anginal equivalent and diabetic  2. Hypertension  -Says his blood pressure is good today and typically at home is in the 561A to 503F systolic  3. T2DM  -Hemoglobin A1c is 6.6  4. SVT  -Never had episodes of fast heart rates prior to being hospitalized  5.  Screening cholesterol  -We will obtain his cholesterol profile from his primary care    Follow-up appointment in February    Orders Placed This Encounter    AMB POC EKG ROUTINE W/ 12 LEADS, INTER & REP     Order Specific Question:   Reason for Exam:     Answer:   CHOL    amLODIPine-atorvastatin (CADUET) 10-10 mg per tablet     Sig: Take 1 Tablet by mouth daily. lisinopriL (PRINIVIL, ZESTRIL) 5 mg tablet     Sig: Take  by mouth daily. vit C/E/Zn/coppr/lutein/zeaxan (PRESERVISION AREDS-2 PO)     Sig: Take  by mouth. We discussed the expected course, resolution and complications of the diagnosis(es) in detail. Medication risks, benefits, costs, interactions, and alternatives were discussed as indicated. I advised him to contact the office if his condition worsens, changes or fails to improve as anticipated. He expressed understanding with the diagnosis(es) and plan          Follow-up and Dispositions    Return in about 6 months (around 5/30/2023). I have discussed the diagnosis with  Yazdanism Lang and the intended plan as seen in the above orders. Questions were answered concerning future plans. I have discussed medication side effects and warnings with the patient as well. Thank you,  Dallas Luna MD for involving me in the care of  Giovani Walker. Please do not hesitate to contact me for further questions/concerns. Luis Perez MD, [facility] [address]      [facility], [address] [place] [address] [facility]      [phone] / [phone] Fax

## 2022-11-30 NOTE — LETTER
11/30/2022    Patient: Mario Meredith   YOB: 1955   Date of Visit: 11/30/2022     Lopez Kinsey, [address] [address]  Via Fax: [phone]    Dear Lopez Kinsey MD,      Thank you for referring Mr. Mario Meredith to [address]  for evaluation. My notes for this consultation are attached. If you have questions, please do not hesitate to call me. I look forward to following your patient along with you.       Sincerely,    Justyn Mclean MD

## 2023-01-21 LAB — HBA1C MFR BLD HPLC: 6.3 %

## 2023-02-06 ENCOUNTER — ANCILLARY PROCEDURE (OUTPATIENT)
Dept: CARDIOLOGY CLINIC | Age: 68
End: 2023-02-06
Payer: MEDICARE

## 2023-02-06 VITALS — BODY MASS INDEX: 28.63 KG/M2 | WEIGHT: 200 LBS | HEIGHT: 70 IN

## 2023-02-06 DIAGNOSIS — I10 HTN (HYPERTENSION), BENIGN: ICD-10-CM

## 2023-02-06 DIAGNOSIS — I44.4 LEFT ANTERIOR HEMIBLOCK: ICD-10-CM

## 2023-02-06 DIAGNOSIS — R77.8 TROPONIN LEVEL ELEVATED: ICD-10-CM

## 2023-02-06 LAB
NUC STRESS EJECTION FRACTION: 51 %
STRESS ANGINA INDEX: 0
STRESS BASELINE DIAS BP: 82 MMHG
STRESS BASELINE HR: 98 BPM
STRESS BASELINE SYS BP: 136 MMHG
STRESS ESTIMATED WORKLOAD: 7 METS
STRESS EXERCISE DUR MIN: 5 MIN
STRESS EXERCISE DUR SEC: 0 SEC
STRESS O2 SAT PEAK: 96 %
STRESS O2 SAT REST: 99 %
STRESS PEAK DIAS BP: 88 MMHG
STRESS PEAK SYS BP: 162 MMHG
STRESS PERCENT HR ACHIEVED: 92 %
STRESS POST PEAK HR: 141 BPM
STRESS RATE PRESSURE PRODUCT: NORMAL BPM*MMHG
STRESS TARGET HR: 153 BPM

## 2023-02-06 PROCEDURE — 78452 HT MUSCLE IMAGE SPECT MULT: CPT | Performed by: INTERNAL MEDICINE

## 2023-02-06 PROCEDURE — 93017 CV STRESS TEST TRACING ONLY: CPT | Performed by: INTERNAL MEDICINE

## 2023-02-06 PROCEDURE — 93016 CV STRESS TEST SUPVJ ONLY: CPT | Performed by: INTERNAL MEDICINE

## 2023-02-06 PROCEDURE — A9500 TC99M SESTAMIBI: HCPCS | Performed by: INTERNAL MEDICINE

## 2023-02-06 PROCEDURE — 93018 CV STRESS TEST I&R ONLY: CPT | Performed by: INTERNAL MEDICINE

## 2023-02-06 RX ORDER — TETRAKIS(2-METHOXYISOBUTYLISOCYANIDE)COPPER(I) TETRAFLUOROBORATE 1 MG/ML
30 INJECTION, POWDER, LYOPHILIZED, FOR SOLUTION INTRAVENOUS ONCE
Status: COMPLETED | OUTPATIENT
Start: 2023-02-06 | End: 2023-02-06

## 2023-02-06 RX ORDER — TETRAKIS(2-METHOXYISOBUTYLISOCYANIDE)COPPER(I) TETRAFLUOROBORATE 1 MG/ML
10 INJECTION, POWDER, LYOPHILIZED, FOR SOLUTION INTRAVENOUS ONCE
Status: COMPLETED | OUTPATIENT
Start: 2023-02-06 | End: 2023-02-06

## 2023-02-06 RX ORDER — TETRAKIS(2-METHOXYISOBUTYLISOCYANIDE)COPPER(I) TETRAFLUOROBORATE 1 MG/ML
10 INJECTION, POWDER, LYOPHILIZED, FOR SOLUTION INTRAVENOUS ONCE
Status: SHIPPED | OUTPATIENT
Start: 2023-02-06 | End: 2023-02-06

## 2023-02-06 RX ORDER — TETRAKIS(2-METHOXYISOBUTYLISOCYANIDE)COPPER(I) TETRAFLUOROBORATE 1 MG/ML
30 INJECTION, POWDER, LYOPHILIZED, FOR SOLUTION INTRAVENOUS ONCE
Status: SHIPPED | OUTPATIENT
Start: 2023-02-06 | End: 2023-02-06

## 2023-02-06 RX ADMIN — TECHNETIUM TC 99M SESTAMIBI 8.1 MILLICURIE: 1 INJECTION, POWDER, FOR SOLUTION INTRAVENOUS at 08:02

## 2023-02-06 RX ADMIN — TECHNETIUM TC 99M SESTAMIBI 25.8 MILLICURIE: 1 INJECTION, POWDER, FOR SOLUTION INTRAVENOUS at 08:51

## 2023-03-08 NOTE — PATIENT INSTRUCTIONS

## 2023-03-08 NOTE — PROGRESS NOTES
CARDIOLOGY OFFICE NOTE    Luis Amezquita MD, [address], [place], [address] [facility]  Phone [phone]; Fax [phone]  Mobile [phone]   Voice Mail [phone]    Primary care: Merced Valladares MD       ATTENTION:   This medical record was transcribed using an electronic medical records/speech recognition system. Although proofread, it may and can contain electronic, spelling and other errors. Corrections may be executed at a later time. Please feel free to contact us for any clarifications as needed. Andres Chand is a 79 y.o. male with  referred for elevated troponin and proBNP during hospitalization for sepsis          Cardiac risk factors: dyslipidemia, diabetes mellitus, obesity, male gender, hypertension  I have personally obtained the history from the patient. HISTORY OF PRESENTING ILLNESS    Ms./Mr. Andres Chand  79 y.o. is being seen in the office after  during hospitalization for severe sepsis during which time his troponin and his proBNP were elevated. .  His past medical history significant for hypertension and T2DM as well as prostate cancer. He states he is doing well over all from a cardiac standpoint. He is slightly tachycardic today in reviewing his pulse its been somewhat high and he states even when he does activities he has noticed his heart rate going up. He is on prednisone for potential temporal arteritis although the biopsy was negative. His symptoms have improved with the prednisone and this may be why his lipids are slightly out of whack. His LDL although high is not that high as it was previously and his HDL is improved. We reviewed this today. I did asked that he have a EKG today we will put a Holter monitor on him for 48 hours.      ACTIVE PROBLEM LIST     Patient Active Problem List    Diagnosis Date Noted    Severe sepsis ([facility] 10/15/2022    Hypotension 10/15/2022    ALYSHA (acute kidney injury) ([facility] 10/15/2022    DM type 2 (diabetes mellitus, type 2) ([facility] 10/15/2022           PAST MEDICAL HISTORY     Past Medical History:   Diagnosis Date    DM type 2 (diabetes mellitus, type 2) ([facility]     Hypertension     Prostate cancer ([facility]            PAST SURGICAL HISTORY     Past Surgical History:   Procedure Laterality Date    HX APPENDECTOMY      HX PROSTATECTOMY            ALLERGIES     Allergies   Allergen Reactions    Penicillins Hives          FAMILY HISTORY     Family History   Problem Relation Age of Onset    Hypertension Father     negative for cardiac disease       SOCIAL HISTORY     Social History     Socioeconomic History    Marital status:    Tobacco Use    Smoking status: Former     Types: Cigarettes    Smokeless tobacco: Never    Tobacco comments:     Quit decades ago   Substance and Sexual Activity    Alcohol use: Yes    Drug use: Never         MEDICATIONS     Current Outpatient Medications   Medication Sig    predniSONE (DELTASONE) 5 mg tablet 5 mg. 4 tab by mouth every day. lisinopriL (PRINIVIL, ZESTRIL) 5 mg tablet Take  by mouth daily. vit C/E/Zn/coppr/lutein/zeaxan (PRESERVISION AREDS-2 PO) Take  by mouth. amLODIPine (NORVASC) 10 mg tablet Take 1 Tablet by mouth daily. hydroCHLOROthiazide (HYDRODIURIL) 25 mg tablet Take 25 mg by mouth daily. glipiZIDE (GLUCOTROL) 10 mg tablet Take 10 mg by mouth Daily (before breakfast). metFORMIN (GLUCOPHAGE) 500 mg tablet Take 500 mg by mouth two (2) times daily (with meals). aspirin delayed-release 81 mg tablet Take 81 mg by mouth daily. sildenafil citrate (VIAGRA) 100 mg tablet Take 25 mg by mouth daily as needed for Erectile Dysfunction. latanoprost (XALATAN) 0.005 % ophthalmic solution Administer 1 Drop to both eyes nightly. timoloL maleate 0.5 % drpd ophthalmic solution Administer 1 Drop to both eyes daily. No current facility-administered medications for this visit.        I have reviewed the nurses notes, vitals, problem list, allergy list, medical history, family, social history and medications. REVIEW OF SYMPTOMS    As per HPI  General: Pt denies excessive weight gain or loss. Pt is able to conduct ADL's  HEENT: Denies blurred vision, headaches, hearing loss, epistaxis and difficulty swallowing. Respiratory: Denies cough, congestion, shortness of breath, ELLIOTT, wheezing or stridor. Cardiovascular: Denies precordial pain, palpitations, edema or PND  Gastrointestinal: Denies poor appetite, indigestion, abdominal pain or blood in stool  Genitourinary: Denies hematuria, dysuria, increased urinary frequency  Musculoskeletal: Denies joint pain or swelling from muscles or joints  Neurologic: Denies tremor, paresthesias, headache, or sensory motor disturbance  Psychiatric: Denies confusion, insomnia, depression  Integumentray: Denies rash, itching or ulcers. Hematologic: Denies easy bruising, bleeding     PHYSICAL EXAMINATION      Vitals:    03/09/23 1510   BP: 138/88   Pulse: (!) 114   SpO2: 95%   Weight: 200 lb (90.7 kg)   Height: 5' 10\" (1.778 m)       General: Well developed, in no acute distress. HEENT: No jaundice, oral mucosa moist, no oral ulcers  Neck: Supple, no stiffness, no lymphadenopathy, supple  Heart: Fast rate regular rhythm  Respiratory: Clear bilaterally x 4, no wheezing or rales  Extremities:  No edema, normal cap refill, no cyanosis. Musculoskeletal: No clubbing, no deformities  Neuro: A&Ox3, speech clear, gait stable, cooperative, no focal neurologic deficits  Skin: Skin color is normal. No rashes or lesions. Non diaphoretic, moist.      EKG: Date: (3/9/2023) sinus tachycardia     DIAGNOSTIC DATA     1. Echo  10/18/22-EF 50 - 55%, Mild global hypokinesis,Tricuspid AV- Mild sclerosis of AV cusp. Mild annular calcification of MV    2. Lipids  7/11/22- , HDL 34, ,     3.  Stress Test  Cardiolite-2/6/23-no ischemia, 7 mets, 5 min, EF 51%         LABORATORY DATA       Lab Results Component Value Date/Time    WBC 8.4 10/19/2022 03:19 AM    HGB 10.0 (L) 10/19/2022 03:19 AM    HCT 31.3 (L) 10/19/2022 03:19 AM    PLATELET 059 33/19/5203 03:19 AM    MCV 81.7 10/19/2022 03:19 AM      Lab Results   Component Value Date/Time    Sodium 139 10/19/2022 03:19 AM    Potassium 4.0 10/19/2022 03:19 AM    Chloride 105 10/19/2022 03:19 AM    CO2 28 10/19/2022 03:19 AM    Anion gap 6 10/19/2022 03:19 AM    Glucose 124 (H) 10/19/2022 03:19 AM    BUN 13 10/19/2022 03:19 AM    Creatinine 0.91 10/19/2022 03:19 AM    BUN/Creatinine ratio 14 10/19/2022 03:19 AM    Calcium 8.7 10/19/2022 03:19 AM    Bilirubin, total 0.6 10/17/2022 04:05 AM    Alk. phosphatase 89 10/17/2022 04:05 AM    Protein, total 7.2 10/17/2022 04:05 AM    Albumin 2.7 (L) 10/17/2022 04:05 AM    Globulin 4.5 (H) 10/17/2022 04:05 AM    A-G Ratio 0.6 (L) 10/17/2022 04:05 AM    ALT (SGPT) 87 (H) 10/17/2022 04:05 AM      ECG (11/30/2022)-sinus rhythm left anterior hemiblock      ICD-10-CM ICD-9-CM   1. HTN (hypertension), benign  I10 401.1   2. Left anterior hemiblock  I44.4 426.2        ASSESSMENT/RECOMMENDATIONS:.      1.  Elevated troponin/proBNP  -Was felt to be related to supply and demand mismatch and not so much an MI but more of a type II MI.    -He had a stress test done 2/6/2023 with no evidence ischemia. His echo revealed a EF of 50-55%. -Cardiac work-up was negative  2. Hypertension  -Blood pressure is borderline  -He does not want to go on any additional medicine but may have to add a beta-blocker to control his heart rate  3. T2DM  -Hemoglobin A1c was 6.6 now is in the 8 range related most likely to his steroids  4. SVT  -No episodes of SVT   -We will place a 48-hour Holter monitor on him for tachycardia  5.  Screening cholesterol  -Last LDL was only slightly elevated  -Encouraged diet exercise and weight loss and would repeat labs in 6 months    Follow-up appointment 6 months      Orders Placed This Encounter    predniSONE (DELTASONE) 5 mg tablet     Si mg. 4 tab by mouth every day. We discussed the expected course, resolution and complications of the diagnosis(es) in detail. Medication risks, benefits, costs, interactions, and alternatives were discussed as indicated. I advised him to contact the office if his condition worsens, changes or fails to improve as anticipated. He expressed understanding with the diagnosis(es) and plan          Follow-up and Dispositions    Return in about 6 months (around 2023). I have discussed the diagnosis with  Joy Jackson and the intended plan as seen in the above orders. Questions were answered concerning future plans. I have discussed medication side effects and warnings with the patient as well. Thank you,  Steve Tobin MD for involving me in the care of  Joy Jackson. Please do not hesitate to contact me for further questions/concerns. Luis Perez MD, [address] [place] [address] [address]      [facility], [address] [place] [address] [address]      [phone] / [phone] Fax

## 2023-03-09 ENCOUNTER — OFFICE VISIT (OUTPATIENT)
Dept: CARDIOLOGY CLINIC | Age: 68
End: 2023-03-09
Payer: MEDICARE

## 2023-03-09 VITALS
OXYGEN SATURATION: 95 % | DIASTOLIC BLOOD PRESSURE: 88 MMHG | HEART RATE: 114 BPM | WEIGHT: 200 LBS | BODY MASS INDEX: 28.63 KG/M2 | SYSTOLIC BLOOD PRESSURE: 138 MMHG | HEIGHT: 70 IN

## 2023-03-09 DIAGNOSIS — I10 HTN (HYPERTENSION), BENIGN: Primary | ICD-10-CM

## 2023-03-09 DIAGNOSIS — I44.4 LEFT ANTERIOR HEMIBLOCK: ICD-10-CM

## 2023-03-09 PROCEDURE — G0463 HOSPITAL OUTPT CLINIC VISIT: HCPCS | Performed by: SPECIALIST

## 2023-03-09 PROCEDURE — 93005 ELECTROCARDIOGRAM TRACING: CPT | Performed by: SPECIALIST

## 2023-03-09 RX ORDER — PREDNISONE 5 MG/1
5 TABLET ORAL
COMMUNITY
Start: 2023-02-23

## 2023-03-09 NOTE — PROGRESS NOTES
Chief Complaint   Patient presents with    Follow-up     2 mo     Hypertension     Vitals:    03/09/23 1510   BP: 138/88   BP 1 Location: Left upper arm   BP Patient Position: Sitting   Pulse: (!) 114   Height: 5' 10\" (1.778 m)   Weight: 200 lb (90.7 kg)   SpO2: 95%       Chest pain denied     SOB denied     Palpitations denied     Swelling in hands/feet denied     Dizziness denied     Recent hospital stays denied     Refills denied

## 2023-03-09 NOTE — LETTER
3/9/2023    Patient: Van Ramsey   YOB: 1955   Date of Visit: 3/9/2023     Blanca Garcia, [address] [address]  Via Fax: [phone]    Dear Blanca Garcia MD,      Thank you for referring Mr. Van Ramsey to [address] [place] [address] for evaluation. My notes for this consultation are attached. If you have questions, please do not hesitate to call me. I look forward to following your patient along with you.       Sincerely,    Cece Bledsoe MD

## 2023-03-17 ENCOUNTER — CLINICAL SUPPORT (OUTPATIENT)
Dept: CARDIOLOGY CLINIC | Age: 68
End: 2023-03-17
Payer: MEDICARE

## 2023-03-17 DIAGNOSIS — R00.0 TACHYCARDIA: Primary | ICD-10-CM

## 2023-03-17 PROCEDURE — 93225 XTRNL ECG REC<48 HRS REC: CPT | Performed by: SPECIALIST

## 2023-03-17 NOTE — PROGRESS NOTES
Applied 48 hr holter per Dr Kellogg Gather dx: tachmayra. Pt has #[id number]. Chargeable visit.   [place]

## 2023-03-31 ENCOUNTER — TELEPHONE (OUTPATIENT)
Dept: CARDIOLOGY CLINIC | Age: 68
End: 2023-03-31

## 2023-06-26 ENCOUNTER — ANESTHESIA (OUTPATIENT)
Facility: HOSPITAL | Age: 68
End: 2023-06-26
Payer: MEDICARE

## 2023-06-26 ENCOUNTER — ANESTHESIA EVENT (OUTPATIENT)
Facility: HOSPITAL | Age: 68
End: 2023-06-26
Payer: MEDICARE

## 2023-06-26 ENCOUNTER — HOSPITAL ENCOUNTER (OUTPATIENT)
Facility: HOSPITAL | Age: 68
Setting detail: OUTPATIENT SURGERY
Discharge: HOME | End: 2023-06-26
Attending: INTERNAL MEDICINE | Admitting: INTERNAL MEDICINE
Payer: MEDICARE

## 2023-06-26 VITALS
HEIGHT: 70 IN | DIASTOLIC BLOOD PRESSURE: 72 MMHG | HEART RATE: 74 BPM | SYSTOLIC BLOOD PRESSURE: 112 MMHG | OXYGEN SATURATION: 98 % | RESPIRATION RATE: 19 BRPM | BODY MASS INDEX: 31.31 KG/M2 | WEIGHT: 218.7 LBS | TEMPERATURE: 97.8 F

## 2023-06-26 LAB
GLUCOSE BLD STRIP.AUTO-MCNC: 136 MG/DL (ref 65–117)
SERVICE CMNT-IMP: ABNORMAL

## 2023-06-26 PROCEDURE — 2709999900 HC NON-CHARGEABLE SUPPLY: Performed by: INTERNAL MEDICINE

## 2023-06-26 PROCEDURE — 3700000000 HC ANESTHESIA ATTENDED CARE: Performed by: INTERNAL MEDICINE

## 2023-06-26 PROCEDURE — 7100000011 HC PHASE II RECOVERY - ADDTL 15 MIN: Performed by: INTERNAL MEDICINE

## 2023-06-26 PROCEDURE — 88305 TISSUE EXAM BY PATHOLOGIST: CPT

## 2023-06-26 PROCEDURE — 82962 GLUCOSE BLOOD TEST: CPT

## 2023-06-26 PROCEDURE — 3600007502: Performed by: INTERNAL MEDICINE

## 2023-06-26 PROCEDURE — 2580000003 HC RX 258: Performed by: INTERNAL MEDICINE

## 2023-06-26 PROCEDURE — 6360000002 HC RX W HCPCS: Performed by: NURSE ANESTHETIST, CERTIFIED REGISTERED

## 2023-06-26 PROCEDURE — 3700000001 HC ADD 15 MINUTES (ANESTHESIA): Performed by: INTERNAL MEDICINE

## 2023-06-26 PROCEDURE — 7100000010 HC PHASE II RECOVERY - FIRST 15 MIN: Performed by: INTERNAL MEDICINE

## 2023-06-26 PROCEDURE — 3600007512: Performed by: INTERNAL MEDICINE

## 2023-06-26 RX ORDER — SODIUM CHLORIDE 0.9 % (FLUSH) 0.9 %
5-40 SYRINGE (ML) INJECTION PRN
Status: DISCONTINUED | OUTPATIENT
Start: 2023-06-26 | End: 2023-06-26 | Stop reason: HOSPADM

## 2023-06-26 RX ORDER — SODIUM CHLORIDE 0.9 % (FLUSH) 0.9 %
5-40 SYRINGE (ML) INJECTION EVERY 12 HOURS SCHEDULED
Status: DISCONTINUED | OUTPATIENT
Start: 2023-06-26 | End: 2023-06-26 | Stop reason: HOSPADM

## 2023-06-26 RX ORDER — PROPOFOL 10 MG/ML
INJECTION, EMULSION INTRAVENOUS CONTINUOUS PRN
Status: DISCONTINUED | OUTPATIENT
Start: 2023-06-26 | End: 2023-06-26 | Stop reason: SDUPTHER

## 2023-06-26 RX ORDER — SODIUM CHLORIDE 9 MG/ML
25 INJECTION, SOLUTION INTRAVENOUS PRN
Status: DISCONTINUED | OUTPATIENT
Start: 2023-06-26 | End: 2023-06-26 | Stop reason: HOSPADM

## 2023-06-26 RX ADMIN — PROPOFOL 25 MG: 10 INJECTION, EMULSION INTRAVENOUS at 10:09

## 2023-06-26 RX ADMIN — SODIUM CHLORIDE: 9 INJECTION, SOLUTION INTRAVENOUS at 10:00

## 2023-06-26 RX ADMIN — PROPOFOL 150 MCG/KG/MIN: 10 INJECTION, EMULSION INTRAVENOUS at 10:06

## 2023-06-26 RX ADMIN — PROPOFOL 50 MG: 10 INJECTION, EMULSION INTRAVENOUS at 10:07

## 2023-06-26 ASSESSMENT — PAIN SCALES - GENERAL: PAINLEVEL_OUTOF10: 0

## 2023-06-26 ASSESSMENT — PAIN - FUNCTIONAL ASSESSMENT: PAIN_FUNCTIONAL_ASSESSMENT: 0-10

## 2023-07-07 ENCOUNTER — HOSPITAL ENCOUNTER (OUTPATIENT)
Facility: HOSPITAL | Age: 68
End: 2023-07-07
Attending: INTERNAL MEDICINE
Payer: MEDICARE

## 2023-07-07 DIAGNOSIS — C18.9 MALIGNANT NEOPLASM OF COLON, UNSPECIFIED PART OF COLON (HCC): ICD-10-CM

## 2023-07-07 PROCEDURE — 74177 CT ABD & PELVIS W/CONTRAST: CPT

## 2023-07-07 PROCEDURE — 6360000004 HC RX CONTRAST MEDICATION: Performed by: INTERNAL MEDICINE

## 2023-07-07 PROCEDURE — 82565 ASSAY OF CREATININE: CPT

## 2023-07-07 RX ADMIN — IOPAMIDOL 100 ML: 755 INJECTION, SOLUTION INTRAVENOUS at 17:58

## 2023-07-08 LAB — CREAT BLD-MCNC: 1 MG/DL (ref 0.6–1.3)

## (undated) DEVICE — MARKER ENDO 10ML INK VI STRL ENDOMARK

## (undated) DEVICE — NEEDLE SCLERO 23 GAX4 MM 1.8 MMX200 CM CNTRST SHTH INTERJECT

## (undated) DEVICE — CONTAINER SPEC 20 ML LID NEUT BUFF FORMALIN 10 % POLYPR STS

## (undated) DEVICE — CANNULA CAPNOGRAPHY AD O2 TBNG L7FT FEM LUER STYL 4707F25] SALTER LABS INC]

## (undated) DEVICE — ACUSNARE POLYPECTOMY SNARE SOFT: Brand: ACUSNARE